# Patient Record
Sex: MALE | Race: WHITE | NOT HISPANIC OR LATINO | Employment: OTHER | URBAN - NONMETROPOLITAN AREA
[De-identification: names, ages, dates, MRNs, and addresses within clinical notes are randomized per-mention and may not be internally consistent; named-entity substitution may affect disease eponyms.]

---

## 2022-12-13 ENCOUNTER — OFFICE VISIT (OUTPATIENT)
Dept: LAB | Facility: HOSPITAL | Age: 37
End: 2022-12-13

## 2022-12-13 DIAGNOSIS — Z01.818 PRE-OP TESTING: ICD-10-CM

## 2022-12-13 LAB
ATRIAL RATE: 78 BPM
P AXIS: 32 DEGREES
PR INTERVAL: 134 MS
QRS AXIS: 27 DEGREES
QRSD INTERVAL: 98 MS
QT INTERVAL: 378 MS
QTC INTERVAL: 430 MS
T WAVE AXIS: 58 DEGREES
VENTRICULAR RATE: 78 BPM

## 2022-12-16 RX ORDER — METOPROLOL TARTRATE 50 MG/1
50 TABLET, FILM COATED ORAL EVERY 12 HOURS SCHEDULED
COMMUNITY

## 2022-12-16 RX ORDER — HYDROXYZINE HYDROCHLORIDE 25 MG/1
25 TABLET, FILM COATED ORAL
COMMUNITY

## 2022-12-16 RX ORDER — IBUPROFEN 400 MG/1
TABLET ORAL EVERY 6 HOURS PRN
COMMUNITY

## 2022-12-16 RX ORDER — LISINOPRIL 10 MG/1
10 TABLET ORAL DAILY
COMMUNITY

## 2022-12-16 NOTE — PRE-PROCEDURE INSTRUCTIONS
Pre-Surgery Instructions:   Medication Instructions   • hydrOXYzine HCL (ATARAX) 25 mg tablet Take night before surgery   • ibuprofen (MOTRIN) 400 mg tablet Stop taking 3 days prior to surgery  • lisinopril (ZESTRIL) 10 mg tablet Hold day of surgery  • metoprolol tartrate (LOPRESSOR) 50 mg tablet Take day of surgery     • naloxone (NARCAN) 0 04 mg/mL Pt has available - recovering alcoholic- has not used   See above

## 2022-12-20 ENCOUNTER — HOSPITAL ENCOUNTER (OUTPATIENT)
Facility: HOSPITAL | Age: 37
Setting detail: OUTPATIENT SURGERY
Discharge: HOME | End: 2022-12-20
Attending: STUDENT IN AN ORGANIZED HEALTH CARE EDUCATION/TRAINING PROGRAM | Admitting: STUDENT IN AN ORGANIZED HEALTH CARE EDUCATION/TRAINING PROGRAM

## 2022-12-20 ENCOUNTER — ANESTHESIA EVENT (OUTPATIENT)
Dept: PERIOP | Facility: HOSPITAL | Age: 37
End: 2022-12-20

## 2022-12-20 ENCOUNTER — ANESTHESIA (OUTPATIENT)
Dept: PERIOP | Facility: HOSPITAL | Age: 37
End: 2022-12-20

## 2022-12-20 VITALS
RESPIRATION RATE: 18 BRPM | OXYGEN SATURATION: 94 % | BODY MASS INDEX: 37.52 KG/M2 | HEART RATE: 77 BPM | HEIGHT: 71 IN | TEMPERATURE: 98.3 F | SYSTOLIC BLOOD PRESSURE: 124 MMHG | DIASTOLIC BLOOD PRESSURE: 82 MMHG | WEIGHT: 268 LBS

## 2022-12-20 DIAGNOSIS — K42.9 UMBILICAL HERNIA WITHOUT OBSTRUCTION AND WITHOUT GANGRENE: Primary | ICD-10-CM

## 2022-12-20 PROBLEM — F17.200 CURRENT SMOKER: Status: ACTIVE | Noted: 2022-12-20

## 2022-12-20 PROBLEM — I10 HYPERTENSION: Status: ACTIVE | Noted: 2022-12-20

## 2022-12-20 DEVICE — VENTRALIGHT ST MESH WITH ECHO 2 POSITIONING SYSTEM 11 CM (4.5)" CIRCLE
Type: IMPLANTABLE DEVICE | Site: UMBILICAL | Status: FUNCTIONAL
Brand: VENTRALIGHT ST MESH WITH ECHO 2 POSITIONING SYSTEM

## 2022-12-20 RX ORDER — DEXMEDETOMIDINE HYDROCHLORIDE 100 UG/ML
INJECTION, SOLUTION INTRAVENOUS AS NEEDED
Status: DISCONTINUED | OUTPATIENT
Start: 2022-12-20 | End: 2022-12-20

## 2022-12-20 RX ORDER — OXYCODONE HYDROCHLORIDE 5 MG/1
5 TABLET ORAL EVERY 4 HOURS PRN
Qty: 15 TABLET | Refills: 0 | Status: SHIPPED | OUTPATIENT
Start: 2022-12-20 | End: 2022-12-30

## 2022-12-20 RX ORDER — FENTANYL CITRATE 50 UG/ML
INJECTION, SOLUTION INTRAMUSCULAR; INTRAVENOUS
Status: COMPLETED
Start: 2022-12-20 | End: 2022-12-20

## 2022-12-20 RX ORDER — ROCURONIUM BROMIDE 10 MG/ML
INJECTION, SOLUTION INTRAVENOUS AS NEEDED
Status: DISCONTINUED | OUTPATIENT
Start: 2022-12-20 | End: 2022-12-20

## 2022-12-20 RX ORDER — ONDANSETRON 2 MG/ML
4 INJECTION INTRAMUSCULAR; INTRAVENOUS ONCE AS NEEDED
Status: DISCONTINUED | OUTPATIENT
Start: 2022-12-20 | End: 2022-12-20 | Stop reason: HOSPADM

## 2022-12-20 RX ORDER — SODIUM CHLORIDE, SODIUM LACTATE, POTASSIUM CHLORIDE, CALCIUM CHLORIDE 600; 310; 30; 20 MG/100ML; MG/100ML; MG/100ML; MG/100ML
INJECTION, SOLUTION INTRAVENOUS CONTINUOUS PRN
Status: DISCONTINUED | OUTPATIENT
Start: 2022-12-20 | End: 2022-12-20

## 2022-12-20 RX ORDER — ONDANSETRON 2 MG/ML
INJECTION INTRAMUSCULAR; INTRAVENOUS AS NEEDED
Status: DISCONTINUED | OUTPATIENT
Start: 2022-12-20 | End: 2022-12-20

## 2022-12-20 RX ORDER — MIDAZOLAM HYDROCHLORIDE 2 MG/2ML
INJECTION, SOLUTION INTRAMUSCULAR; INTRAVENOUS AS NEEDED
Status: DISCONTINUED | OUTPATIENT
Start: 2022-12-20 | End: 2022-12-20

## 2022-12-20 RX ORDER — ACETAMINOPHEN 325 MG/1
975 TABLET ORAL ONCE
Status: COMPLETED | OUTPATIENT
Start: 2022-12-20 | End: 2022-12-20

## 2022-12-20 RX ORDER — FENTANYL CITRATE 50 UG/ML
INJECTION, SOLUTION INTRAMUSCULAR; INTRAVENOUS AS NEEDED
Status: DISCONTINUED | OUTPATIENT
Start: 2022-12-20 | End: 2022-12-20

## 2022-12-20 RX ORDER — PROPOFOL 10 MG/ML
INJECTION, EMULSION INTRAVENOUS AS NEEDED
Status: DISCONTINUED | OUTPATIENT
Start: 2022-12-20 | End: 2022-12-20

## 2022-12-20 RX ORDER — LIDOCAINE HYDROCHLORIDE 10 MG/ML
INJECTION, SOLUTION EPIDURAL; INFILTRATION; INTRACAUDAL; PERINEURAL AS NEEDED
Status: DISCONTINUED | OUTPATIENT
Start: 2022-12-20 | End: 2022-12-20

## 2022-12-20 RX ORDER — HYDROMORPHONE HCL/PF 1 MG/ML
SYRINGE (ML) INJECTION AS NEEDED
Status: DISCONTINUED | OUTPATIENT
Start: 2022-12-20 | End: 2022-12-20

## 2022-12-20 RX ORDER — FENTANYL CITRATE/PF 50 MCG/ML
25 SYRINGE (ML) INJECTION
Status: DISCONTINUED | OUTPATIENT
Start: 2022-12-20 | End: 2022-12-20 | Stop reason: HOSPADM

## 2022-12-20 RX ORDER — DEXAMETHASONE SODIUM PHOSPHATE 10 MG/ML
INJECTION, SOLUTION INTRAMUSCULAR; INTRAVENOUS AS NEEDED
Status: DISCONTINUED | OUTPATIENT
Start: 2022-12-20 | End: 2022-12-20

## 2022-12-20 RX ORDER — KETOROLAC TROMETHAMINE 30 MG/ML
INJECTION, SOLUTION INTRAMUSCULAR; INTRAVENOUS AS NEEDED
Status: DISCONTINUED | OUTPATIENT
Start: 2022-12-20 | End: 2022-12-20

## 2022-12-20 RX ORDER — HYDROMORPHONE HCL/PF 1 MG/ML
0.25 SYRINGE (ML) INJECTION
Status: DISCONTINUED | OUTPATIENT
Start: 2022-12-20 | End: 2022-12-20 | Stop reason: HOSPADM

## 2022-12-20 RX ORDER — DIPHENHYDRAMINE HYDROCHLORIDE 50 MG/ML
12.5 INJECTION INTRAMUSCULAR; INTRAVENOUS ONCE AS NEEDED
Status: DISCONTINUED | OUTPATIENT
Start: 2022-12-20 | End: 2022-12-20 | Stop reason: HOSPADM

## 2022-12-20 RX ORDER — BUPIVACAINE HYDROCHLORIDE AND EPINEPHRINE 2.5; 5 MG/ML; UG/ML
INJECTION, SOLUTION EPIDURAL; INFILTRATION; INTRACAUDAL; PERINEURAL AS NEEDED
Status: DISCONTINUED | OUTPATIENT
Start: 2022-12-20 | End: 2022-12-20 | Stop reason: HOSPADM

## 2022-12-20 RX ADMIN — SODIUM CHLORIDE, SODIUM LACTATE, POTASSIUM CHLORIDE, CALCIUM CHLORIDE: 600; 310; 30; 20 INJECTION, SOLUTION INTRAVENOUS at 07:48

## 2022-12-20 RX ADMIN — SUGAMMADEX 200 MG: 100 INJECTION, SOLUTION INTRAVENOUS at 09:14

## 2022-12-20 RX ADMIN — FENTANYL CITRATE 25 MCG: 50 INJECTION INTRAMUSCULAR; INTRAVENOUS at 09:37

## 2022-12-20 RX ADMIN — HYDROMORPHONE HYDROCHLORIDE 0.5 MG: 1 INJECTION, SOLUTION INTRAMUSCULAR; INTRAVENOUS; SUBCUTANEOUS at 08:21

## 2022-12-20 RX ADMIN — DEXMEDETOMIDINE HCL 8 MCG: 100 INJECTION INTRAVENOUS at 09:10

## 2022-12-20 RX ADMIN — ONDANSETRON 4 MG: 2 INJECTION INTRAMUSCULAR; INTRAVENOUS at 07:42

## 2022-12-20 RX ADMIN — CEFAZOLIN 3000 MG: 1 INJECTION, POWDER, FOR SOLUTION INTRAMUSCULAR; INTRAVENOUS at 07:39

## 2022-12-20 RX ADMIN — DEXAMETHASONE SODIUM PHOSPHATE 10 MG: 10 INJECTION, SOLUTION INTRAMUSCULAR; INTRAVENOUS at 07:42

## 2022-12-20 RX ADMIN — MIDAZOLAM 2 MG: 1 INJECTION INTRAMUSCULAR; INTRAVENOUS at 07:34

## 2022-12-20 RX ADMIN — DEXMEDETOMIDINE HCL 12 MCG: 100 INJECTION INTRAVENOUS at 09:12

## 2022-12-20 RX ADMIN — KETOROLAC TROMETHAMINE 30 MG: 30 INJECTION, SOLUTION INTRAMUSCULAR at 09:00

## 2022-12-20 RX ADMIN — SODIUM CHLORIDE, SODIUM LACTATE, POTASSIUM CHLORIDE, AND CALCIUM CHLORIDE: .6; .31; .03; .02 INJECTION, SOLUTION INTRAVENOUS at 07:36

## 2022-12-20 RX ADMIN — DEXMEDETOMIDINE HCL 8 MCG: 100 INJECTION INTRAVENOUS at 08:09

## 2022-12-20 RX ADMIN — ACETAMINOPHEN 975 MG: 325 TABLET ORAL at 09:52

## 2022-12-20 RX ADMIN — HYDROMORPHONE HYDROCHLORIDE 0.5 MG: 1 INJECTION, SOLUTION INTRAMUSCULAR; INTRAVENOUS; SUBCUTANEOUS at 09:08

## 2022-12-20 RX ADMIN — FENTANYL CITRATE 25 MCG: 50 INJECTION INTRAMUSCULAR; INTRAVENOUS at 09:44

## 2022-12-20 RX ADMIN — ROCURONIUM BROMIDE 20 MG: 10 INJECTION, SOLUTION INTRAVENOUS at 08:08

## 2022-12-20 RX ADMIN — DEXMEDETOMIDINE HCL 8 MCG: 100 INJECTION INTRAVENOUS at 08:14

## 2022-12-20 RX ADMIN — FENTANYL CITRATE 100 MCG: 50 INJECTION INTRAMUSCULAR; INTRAVENOUS at 07:42

## 2022-12-20 RX ADMIN — ROCURONIUM BROMIDE 10 MG: 10 INJECTION, SOLUTION INTRAVENOUS at 08:52

## 2022-12-20 RX ADMIN — LIDOCAINE HYDROCHLORIDE 50 MG: 10 INJECTION, SOLUTION EPIDURAL; INFILTRATION; INTRACAUDAL; PERINEURAL at 07:42

## 2022-12-20 RX ADMIN — ROCURONIUM BROMIDE 50 MG: 10 INJECTION, SOLUTION INTRAVENOUS at 07:42

## 2022-12-20 RX ADMIN — HYDROMORPHONE HYDROCHLORIDE 0.25 MG: 1 INJECTION, SOLUTION INTRAMUSCULAR; INTRAVENOUS; SUBCUTANEOUS at 09:55

## 2022-12-20 RX ADMIN — HYDROMORPHONE HYDROCHLORIDE 0.25 MG: 1 INJECTION, SOLUTION INTRAMUSCULAR; INTRAVENOUS; SUBCUTANEOUS at 10:05

## 2022-12-20 RX ADMIN — PROPOFOL 200 MG: 10 INJECTION, EMULSION INTRAVENOUS at 07:42

## 2022-12-20 RX ADMIN — DEXMEDETOMIDINE HCL 4 MCG: 100 INJECTION INTRAVENOUS at 08:52

## 2022-12-20 NOTE — DISCHARGE INSTR - AVS FIRST PAGE
Robotic [address] Rexann Riling phone number is [phone]    DISCHARGE INSTRUCTIONS:   Medicines:  Ibuprofen or acetaminophen:  These medicines decrease pain  Alternate tylenol and ibuprofen for pain control  Prescription pain medication:  You have been prescribed Oxycodone  Take this as needed for severe pain  Take your medicine as directed  Contact your healthcare provider if you think your medicine is not helping or if you have side effects  Tell him of her if you are allergic to any medicine  Keep a list of the medicines, vitamins, and herbs you take  Include the amounts, and when and why you take them  Bring the list or the pill bottles to follow-up visits  Carry your medicine list with you in case of an emergency  Follow up with your healthcare provider as scheduled    Remove the dressing in 3 days    Self care: Activity:  Avoid lifting more then 10lb, bending, and straining for 6 weeks  If you have to cough, try to cough gently  Support the hernia by holding your hand or a pillow over it when coughing  Prevent constipation:  Straining to have a bowel movement may make your pain worse  Eat foods that are high in fiber and drink at least 8 glasses of water a day  A high-fiber diet includes whole grains, bran, cereals, and uncooked fruit and vegetables  Walking or other exercise can also help  Your healthcare provider may give you fiber medicine or a stool softener to help make your bowel movements softer and more regular  If you still do not move your bowels, take colace or milk of magnesia    You may shower, do not soak in water for 2 weeks    Contact your healthcare provider if:   You have nausea or vomiting  You have severe pain  You are constipated or have blood in your bowel movements  You have questions or concerns about your condition or care

## 2022-12-20 NOTE — ANESTHESIA PREPROCEDURE EVALUATION
Procedure:  ROBOTIC ASSISTED LAPAROSCOPIC UMBILICAL HERNIA REPAIR WITH MESH (Abdomen)  OPEN UMBILICAL HERNIA REPAIR (Abdomen)    Relevant Problems   CARDIO   (+) Hypertension      PULMONARY   (+) Current smoker      Other   (+) Umbilical hernia        Physical Exam    Airway    Mallampati score: II  TM Distance: >3 FB  Neck ROM: full     Dental       Cardiovascular      Pulmonary  No wheezes,     Other Findings        Anesthesia Plan  ASA Score- 2     Anesthesia Type- general with ASA Monitors  Additional Monitors:   Airway Plan: ETT  Plan Factors-Exercise tolerance (METS): >4 METS  Chart reviewed  EKG reviewed  Patient summary reviewed  Patient is a current smoker  Patient smoked on day of surgery  Induction- intravenous  Postoperative Plan- Plan for postoperative opioid use  Planned trial extubation    Informed Consent- Anesthetic plan and risks discussed with patient  I personally reviewed this patient with the CRNA  Discussed and agreed on the Anesthesia Plan with the CRNA             This is a 44 y o  male who presents for ROBOTIC [address] WITH MESH (Abdomen)  OPEN UMBILICAL HERNIA REPAIR (Abdomen)  -- VITALS --  /76   Pulse 76   Temp 98 3 °F (36 8 °C) (Oral)   Resp 18   Ht 5' 11" (1 803 m)   Wt 122 kg (268 lb)   SpO2 95%   BMI 37 38 kg/m²   BP Readings from Last 3 Encounters:   12/20/22 123/76     -- LABS --  No results for input(s): SODIUM, K, CL, CO2, AGAP, BUN, CREATININE, CALCIUM, GLUC, CAION, PHOS, MG, AST, ALT, ALKPHOS, TBILI, ALB in the last 8784 hours  No results for input(s): WBC, HGB, HCT, PLT in the last 8784 hours  No results for input(s): APTT, INR, PTT in the last 8784 hours      -- ANESTHESIA RISK ASSESSMENT AND SHARED DECISION MAKING --  • Benefits and role of specialized anesthesia care discussed (43 Nica Florentino, PMID [id number]): (1) Specialized anesthesiology support reduces mortality for major surgery by about 100-fold, (2) Anesthesia provides amnesia to the extent appropriate and possible, and (3) Anesthesia works to address analgesia and other symptom control  • The mortality risks associated with anesthesia based on ASA-PS were discussed (PMID [id number]): ASA-PS I 1:250,000; ASA-PS II 1:20,000; ASA-PS III 1:3,500; ASA-PS IV 1:1,800  • The morbidity risks discussed included were but not limited to the following (PMID [id number]):  (1) Neurologic risks: I discussed IntraOp awareness (Risk is ~1:1,000 - 1:14,000, PMID [id number]), Stroke (Risk ~<0 1-2% for most cases, PMID [id number]), and POCD  I also exhorted the patient to avoid driving or performing any other physically or mentally hazardous tasks for 24 hrs after anesthesia  - Since regional anesthesia may be used, risks of block failure, bleeding, infection, and nerve injury were discussed  (2) Airway / Pulmonary risks: I discussed the potential for dental or mouth injury, throat pain, critical hypoxia (which can lead to strokes and organ damage), pneumothorax, and any relevant concerns for prolonged intubation   - Airway considerations: No prior advanced airway notes in [facility]   - Pulmonary risks: Simplified ARISCAT score (Major RFs: Case surgical time estimated at >2hrs: 1pt and Other factors/Clinical gestalt: 1 pt); the estimated risk of pulmonary complications is: 6-2= Low, 1 6%  (3) Cardiovascular risks: I discussed periOp MACE risk (see below) as well as risks of bleeding, infection, or injury to adjacent structures related to relevant vascular access  I discussed risks associated with bypass circuits if relevant         (a) EKG:   Encounter Date: 12/13/22   ECG 12 lead   Result Value    Ventricular Rate 78    Atrial Rate 78    KY Interval 134    QRSD Interval 98    QT Interval 378    QTC Interval 430    P Axis 32    QRS Axis 27    T Wave Axis 58    Narrative    Normal sinus rhythm with sinus arrhythmia  Normal ECG  No previous ECGs available  Confirmed by Anya Oh ([id number]) on 12/13/2022 8:05:15 AM     No results found for this or any previous visit  (b) Echo/Relevant Imaging:   TTE not available  (c) Major risks for severe cardiac instability: none      (d) Gaurav's RCRI (Major RFs: none); the estimate risk of MACE is: 0= 0 4%  (e) Beta blockers indicated?: home metop    (4) FEN/GI risks: I discussed the risk of aspiration (Approximately 0 5% risk per IRIS trial) and PONV (10-80% depending on Apfel criteria)    - Patient meets ASA NPO guidelines: yes    (5) Drug reactions, allergic reactions, overdoses and other drug-related risks:  - Anticoagulation status: none  - Patient took the following medications this morning: metoprolol  - Personal or family history of anesthesia related complications: no  - Pregnancy Status: Not applicable

## 2022-12-20 NOTE — OP NOTE
OPERATIVE REPORT  PATIENT NAME: Kane Krause    :  1985  MRN: [id number]  Pt Location: [facility] OR ROOM 01    SURGERY DATE: 2022    Surgeon(s) and Role:     * Tushar Bearden,  - Primary     * Fartun Patel PA-C - Assisting    Preop Diagnosis:  Umbilical hernia without obstruction or gangrene [K42 9]    Post-Op Diagnosis Codes:     * Umbilical hernia without obstruction or gangrene [K42 9]    Procedure(s) (LRB):  ROBOTIC ASSISTED LAPAROSCOPIC UMBILICAL HERNIA REPAIR WITH MESH (N/A)  OPEN UMBILICAL HERNIA REPAIR (N/A)    Specimen(s):  * No specimens in log *    Estimated Blood Loss:   Minimal    Drains:  [REMOVED] NG/OG/Enteral Tube Orogastric 18 Fr Center mouth (Removed)   Number of days: 0       Anesthesia Type:   General    Operative Indications:  Umbilical hernia without obstruction or gangrene [K42 9]      Operative Findings:  2 cm umbilical hernia containing omentum    Complications:   None    Procedure and Technique:  The patient is brought to the operating room  A timeout was held the patient identified and procedure verified  Appropriate antibiotic prophylaxis and DVT prophylaxis was used  General anesthesia was administered  The area of the abdomen was prepped and draped usual sterile fashion using chlorhexidine solution  Local anesthesia using 0 25% Marcaine with epinephrine was infiltrated in the left subcostal area  The skin was grasped and elevated using towel clamps  A small incision was made using 11 blade scalpel  A Veress needle was placed and confirmed to be intraperitoneal using a saline drop test   The abdomen was insufflated to 15 mmHg intraperitoneal pressure  A 12 mm trocar was placed  The abdomen was inspected  There were adhesions of the omentum in the area of the hernia defect, otherwise the abdomen was free of adhesions    An additional 12 mm trocar was placed in the left lateral abdomen after infiltration of local anesthesia and under direct visualization  An additional 8 mm robotic trocar was placed in the left lower abdomen in the same fashion  The patient was rotated towards the right side of the table was flexed  The robotic cart was advanced into the operative field and appropriately docked  I then took control at the console  Adhesions of the omentum to the anterior abdominal wall were taken down using sharp dissection  The omentum was reduced out of the hernia defect  The preperitoneal fat surrounding the hernia defect was cleared  The hernia defect was closed in a running suture of #1 V-Loc  An 11 cm circular ventral light ST mesh with the echo 2 positioning system was selected  This was introduced into the peritoneal cavity  The central suture was externalized through the center of the hernia defect  The mesh was then secured circumferentially using 201 180-day absorbable V-Loc  The mesh appeared to be in good position  The hernia repair was adequate  The robot was undocked  Fascia of the 12 mm trocar sites was closed using 0 Vicryl with a laparoscopic suture passer  All trochars were removed and the abdomen was desufflated  Skin was closing 4-0 Vicryl in the subcuticular layer  The area of the hernia was packed with gauze and covered with a Tegaderm  The skin incisions were covered with skin glue  This patient tolerated procedure well transferred to recovery in stable condition  At the end the procedure all needle instrument sponge counts were correct x2     I was present for the entire procedure and A physician assistant was required during the procedure for retraction tissue handling,dissection and suturing    Patient Disposition:  PACU         SIGNATURE: Yeny Blair DO  DATE: December 20, 2022  TIME: 9:12 AM

## 2022-12-20 NOTE — ANESTHESIA POSTPROCEDURE EVALUATION
Post-Op Assessment Note    CV Status:  Stable  Pain Score: 0    Pain management: adequate     Mental Status:  Sleepy   Hydration Status:  Stable   PONV Controlled:  None   Airway Patency:  Patent   Two or more mitigation strategies used for obstructive sleep apnea   Post Op Vitals Reviewed: Yes      Staff: Anesthesiologist, CRNA         No notable events documented      BP   135/77   Temp   98 2   Pulse  64   Resp   18   SpO2   97% on facemask

## 2023-10-10 ENCOUNTER — HOSPITAL ENCOUNTER (EMERGENCY)
Facility: HOSPITAL | Age: 38
End: 2023-10-10
Attending: EMERGENCY MEDICINE | Admitting: EMERGENCY MEDICINE
Payer: COMMERCIAL

## 2023-10-10 ENCOUNTER — HOSPITAL ENCOUNTER (INPATIENT)
Facility: HOSPITAL | Age: 38
LOS: 2 days | End: 2023-10-12
Attending: EMERGENCY MEDICINE | Admitting: EMERGENCY MEDICINE
Payer: COMMERCIAL

## 2023-10-10 ENCOUNTER — APPOINTMENT (EMERGENCY)
Dept: CT IMAGING | Facility: HOSPITAL | Age: 38
End: 2023-10-10
Payer: COMMERCIAL

## 2023-10-10 VITALS
WEIGHT: 260 LBS | SYSTOLIC BLOOD PRESSURE: 166 MMHG | TEMPERATURE: 98 F | HEART RATE: 88 BPM | RESPIRATION RATE: 18 BRPM | OXYGEN SATURATION: 97 % | DIASTOLIC BLOOD PRESSURE: 92 MMHG | BODY MASS INDEX: 35.21 KG/M2 | HEIGHT: 72 IN

## 2023-10-10 DIAGNOSIS — K85.20 ALCOHOL-INDUCED ACUTE PANCREATITIS WITHOUT INFECTION OR NECROSIS: Primary | ICD-10-CM

## 2023-10-10 DIAGNOSIS — K85.20 ALCOHOL-INDUCED ACUTE PANCREATITIS WITHOUT INFECTION OR NECROSIS: ICD-10-CM

## 2023-10-10 LAB
2HR DELTA HS TROPONIN: 1 NG/L
ALBUMIN SERPL BCP-MCNC: 4.1 G/DL (ref 3.5–5)
ALP SERPL-CCNC: 45 U/L (ref 34–104)
ALT SERPL W P-5'-P-CCNC: 134 U/L (ref 7–52)
AMPHETAMINES SERPL QL SCN: NEGATIVE
ANION GAP SERPL CALCULATED.3IONS-SCNC: 9 MMOL/L
APTT PPP: 24 SECONDS (ref 23–37)
AST SERPL W P-5'-P-CCNC: 65 U/L (ref 13–39)
ATRIAL RATE: 79 BPM
BACTERIA UR QL AUTO: NORMAL /HPF
BARBITURATES UR QL: NEGATIVE
BASOPHILS # BLD AUTO: 0.04 THOUSANDS/ÂΜL (ref 0–0.1)
BASOPHILS NFR BLD AUTO: 0 % (ref 0–1)
BENZODIAZ UR QL: NEGATIVE
BILIRUB SERPL-MCNC: 0.57 MG/DL (ref 0.2–1)
BILIRUB UR QL STRIP: ABNORMAL
BUN SERPL-MCNC: 10 MG/DL (ref 5–25)
CALCIUM SERPL-MCNC: 8.8 MG/DL (ref 8.4–10.2)
CARDIAC TROPONIN I PNL SERPL HS: 3 NG/L
CARDIAC TROPONIN I PNL SERPL HS: 4 NG/L
CHLORIDE SERPL-SCNC: 103 MMOL/L (ref 96–108)
CLARITY UR: CLEAR
CO2 SERPL-SCNC: 23 MMOL/L (ref 21–32)
COCAINE UR QL: NEGATIVE
COLOR UR: YELLOW
CREAT SERPL-MCNC: 0.73 MG/DL (ref 0.6–1.3)
EOSINOPHIL # BLD AUTO: 0.07 THOUSAND/ÂΜL (ref 0–0.61)
EOSINOPHIL NFR BLD AUTO: 1 % (ref 0–6)
ERYTHROCYTE [DISTWIDTH] IN BLOOD BY AUTOMATED COUNT: 13.7 % (ref 11.6–15.1)
ETHANOL SERPL-MCNC: <10 MG/DL
GFR SERPL CREATININE-BSD FRML MDRD: 116 ML/MIN/1.73SQ M
GLUCOSE SERPL-MCNC: 138 MG/DL (ref 65–140)
GLUCOSE UR STRIP-MCNC: NEGATIVE MG/DL
HCT VFR BLD AUTO: 46.3 % (ref 36.5–49.3)
HGB BLD-MCNC: 16.4 G/DL (ref 12–17)
HGB UR QL STRIP.AUTO: NEGATIVE
IMM GRANULOCYTES # BLD AUTO: 0.06 THOUSAND/UL (ref 0–0.2)
IMM GRANULOCYTES NFR BLD AUTO: 1 % (ref 0–2)
INR PPP: 0.92 (ref 0.84–1.19)
KETONES UR STRIP-MCNC: NEGATIVE MG/DL
LACTATE SERPL-SCNC: 1.3 MMOL/L (ref 0.5–2)
LACTATE SERPL-SCNC: 2.3 MMOL/L (ref 0.5–2)
LEUKOCYTE ESTERASE UR QL STRIP: NEGATIVE
LIPASE SERPL-CCNC: 3832 U/L (ref 11–82)
LYMPHOCYTES # BLD AUTO: 1.68 THOUSANDS/ÂΜL (ref 0.6–4.47)
LYMPHOCYTES NFR BLD AUTO: 15 % (ref 14–44)
MAGNESIUM SERPL-MCNC: 1.9 MG/DL (ref 1.9–2.7)
MCH RBC QN AUTO: 31.5 PG (ref 26.8–34.3)
MCHC RBC AUTO-ENTMCNC: 35.4 G/DL (ref 31.4–37.4)
MCV RBC AUTO: 89 FL (ref 82–98)
METHADONE UR QL: NEGATIVE
MONOCYTES # BLD AUTO: 0.78 THOUSAND/ÂΜL (ref 0.17–1.22)
MONOCYTES NFR BLD AUTO: 7 % (ref 4–12)
NEUTROPHILS # BLD AUTO: 8.48 THOUSANDS/ÂΜL (ref 1.85–7.62)
NEUTS SEG NFR BLD AUTO: 76 % (ref 43–75)
NITRITE UR QL STRIP: NEGATIVE
NON-SQ EPI CELLS URNS QL MICRO: NORMAL /HPF
NRBC BLD AUTO-RTO: 0 /100 WBCS
OPIATES UR QL SCN: NEGATIVE
OXYCODONE+OXYMORPHONE UR QL SCN: NEGATIVE
P AXIS: 35 DEGREES
PCP UR QL: NEGATIVE
PH UR STRIP.AUTO: 5.5 [PH]
PLATELET # BLD AUTO: 175 THOUSANDS/UL (ref 149–390)
PMV BLD AUTO: 10 FL (ref 8.9–12.7)
POTASSIUM SERPL-SCNC: 4.2 MMOL/L (ref 3.5–5.3)
PR INTERVAL: 122 MS
PROT SERPL-MCNC: 7 G/DL (ref 6.4–8.4)
PROT UR STRIP-MCNC: ABNORMAL MG/DL
PROTHROMBIN TIME: 12.7 SECONDS (ref 11.6–14.5)
QRS AXIS: 29 DEGREES
QRSD INTERVAL: 102 MS
QT INTERVAL: 366 MS
QTC INTERVAL: 419 MS
RBC # BLD AUTO: 5.21 MILLION/UL (ref 3.88–5.62)
RBC #/AREA URNS AUTO: NORMAL /HPF
SODIUM SERPL-SCNC: 135 MMOL/L (ref 135–147)
SP GR UR STRIP.AUTO: >=1.03 (ref 1–1.03)
T WAVE AXIS: 46 DEGREES
THC UR QL: NEGATIVE
UROBILINOGEN UR QL STRIP.AUTO: 0.2 E.U./DL
VENTRICULAR RATE: 79 BPM
WBC # BLD AUTO: 11.11 THOUSAND/UL (ref 4.31–10.16)
WBC #/AREA URNS AUTO: NORMAL /HPF

## 2023-10-10 PROCEDURE — 80053 COMPREHEN METABOLIC PANEL: CPT | Performed by: EMERGENCY MEDICINE

## 2023-10-10 PROCEDURE — 83605 ASSAY OF LACTIC ACID: CPT | Performed by: EMERGENCY MEDICINE

## 2023-10-10 PROCEDURE — 96361 HYDRATE IV INFUSION ADD-ON: CPT

## 2023-10-10 PROCEDURE — 85610 PROTHROMBIN TIME: CPT | Performed by: EMERGENCY MEDICINE

## 2023-10-10 PROCEDURE — 83690 ASSAY OF LIPASE: CPT | Performed by: EMERGENCY MEDICINE

## 2023-10-10 PROCEDURE — 82077 ASSAY SPEC XCP UR&BREATH IA: CPT | Performed by: EMERGENCY MEDICINE

## 2023-10-10 PROCEDURE — 99285 EMERGENCY DEPT VISIT HI MDM: CPT | Performed by: EMERGENCY MEDICINE

## 2023-10-10 PROCEDURE — 36415 COLL VENOUS BLD VENIPUNCTURE: CPT | Performed by: EMERGENCY MEDICINE

## 2023-10-10 PROCEDURE — 84484 ASSAY OF TROPONIN QUANT: CPT | Performed by: EMERGENCY MEDICINE

## 2023-10-10 PROCEDURE — 93010 ELECTROCARDIOGRAM REPORT: CPT | Performed by: STUDENT IN AN ORGANIZED HEALTH CARE EDUCATION/TRAINING PROGRAM

## 2023-10-10 PROCEDURE — G1004 CDSM NDSC: HCPCS

## 2023-10-10 PROCEDURE — 81001 URINALYSIS AUTO W/SCOPE: CPT | Performed by: EMERGENCY MEDICINE

## 2023-10-10 PROCEDURE — 85025 COMPLETE CBC W/AUTO DIFF WBC: CPT | Performed by: EMERGENCY MEDICINE

## 2023-10-10 PROCEDURE — 85730 THROMBOPLASTIN TIME PARTIAL: CPT | Performed by: EMERGENCY MEDICINE

## 2023-10-10 PROCEDURE — 83735 ASSAY OF MAGNESIUM: CPT | Performed by: EMERGENCY MEDICINE

## 2023-10-10 PROCEDURE — 99285 EMERGENCY DEPT VISIT HI MDM: CPT

## 2023-10-10 PROCEDURE — 96375 TX/PRO/DX INJ NEW DRUG ADDON: CPT

## 2023-10-10 PROCEDURE — 96376 TX/PRO/DX INJ SAME DRUG ADON: CPT

## 2023-10-10 PROCEDURE — C9113 INJ PANTOPRAZOLE SODIUM, VIA: HCPCS | Performed by: EMERGENCY MEDICINE

## 2023-10-10 PROCEDURE — 93005 ELECTROCARDIOGRAM TRACING: CPT

## 2023-10-10 PROCEDURE — 80307 DRUG TEST PRSMV CHEM ANLYZR: CPT | Performed by: EMERGENCY MEDICINE

## 2023-10-10 PROCEDURE — 96374 THER/PROPH/DIAG INJ IV PUSH: CPT

## 2023-10-10 PROCEDURE — 74177 CT ABD & PELVIS W/CONTRAST: CPT

## 2023-10-10 RX ORDER — ACETAMINOPHEN 325 MG/1
650 TABLET ORAL EVERY 6 HOURS PRN
Status: DISCONTINUED | OUTPATIENT
Start: 2023-10-10 | End: 2023-10-11

## 2023-10-10 RX ORDER — MORPHINE SULFATE 10 MG/ML
6 INJECTION, SOLUTION INTRAMUSCULAR; INTRAVENOUS EVERY 4 HOURS PRN
Status: DISCONTINUED | OUTPATIENT
Start: 2023-10-10 | End: 2023-10-12 | Stop reason: HOSPADM

## 2023-10-10 RX ORDER — HYDROMORPHONE HCL/PF 1 MG/ML
0.5 SYRINGE (ML) INJECTION
Status: DISCONTINUED | OUTPATIENT
Start: 2023-10-10 | End: 2023-10-10 | Stop reason: HOSPADM

## 2023-10-10 RX ORDER — ONDANSETRON 2 MG/ML
4 INJECTION INTRAMUSCULAR; INTRAVENOUS EVERY 6 HOURS PRN
Status: DISCONTINUED | OUTPATIENT
Start: 2023-10-10 | End: 2023-10-12 | Stop reason: HOSPADM

## 2023-10-10 RX ORDER — KETOROLAC TROMETHAMINE 30 MG/ML
15 INJECTION, SOLUTION INTRAMUSCULAR; INTRAVENOUS EVERY 6 HOURS PRN
Status: DISCONTINUED | OUTPATIENT
Start: 2023-10-10 | End: 2023-10-12

## 2023-10-10 RX ORDER — TRAZODONE HYDROCHLORIDE 50 MG/1
50 TABLET ORAL
Status: DISCONTINUED | OUTPATIENT
Start: 2023-10-10 | End: 2023-10-12

## 2023-10-10 RX ORDER — MAGNESIUM HYDROXIDE/ALUMINUM HYDROXICE/SIMETHICONE 120; 1200; 1200 MG/30ML; MG/30ML; MG/30ML
30 SUSPENSION ORAL EVERY 6 HOURS PRN
Status: CANCELLED | OUTPATIENT
Start: 2023-10-10

## 2023-10-10 RX ORDER — ACETAMINOPHEN 325 MG/1
650 TABLET ORAL EVERY 6 HOURS PRN
Status: CANCELLED | OUTPATIENT
Start: 2023-10-10

## 2023-10-10 RX ORDER — FENTANYL CITRATE 50 UG/ML
100 INJECTION, SOLUTION INTRAMUSCULAR; INTRAVENOUS ONCE
Status: COMPLETED | OUTPATIENT
Start: 2023-10-10 | End: 2023-10-10

## 2023-10-10 RX ORDER — SODIUM CHLORIDE 9 MG/ML
100 INJECTION, SOLUTION INTRAVENOUS CONTINUOUS
Status: CANCELLED | OUTPATIENT
Start: 2023-10-10

## 2023-10-10 RX ORDER — HYDROMORPHONE HCL/PF 1 MG/ML
0.5 SYRINGE (ML) INJECTION ONCE
Status: COMPLETED | OUTPATIENT
Start: 2023-10-10 | End: 2023-10-10

## 2023-10-10 RX ORDER — ONDANSETRON 2 MG/ML
4 INJECTION INTRAMUSCULAR; INTRAVENOUS EVERY 6 HOURS PRN
Status: CANCELLED | OUTPATIENT
Start: 2023-10-10

## 2023-10-10 RX ORDER — LORAZEPAM 2 MG/ML
1 INJECTION INTRAMUSCULAR ONCE
Status: COMPLETED | OUTPATIENT
Start: 2023-10-10 | End: 2023-10-10

## 2023-10-10 RX ORDER — NICOTINE 21 MG/24HR
1 PATCH, TRANSDERMAL 24 HOURS TRANSDERMAL DAILY
Status: CANCELLED | OUTPATIENT
Start: 2023-10-11

## 2023-10-10 RX ORDER — TRAZODONE HYDROCHLORIDE 50 MG/1
50 TABLET ORAL
Status: CANCELLED | OUTPATIENT
Start: 2023-10-10

## 2023-10-10 RX ORDER — KETOROLAC TROMETHAMINE 30 MG/ML
15 INJECTION, SOLUTION INTRAMUSCULAR; INTRAVENOUS EVERY 6 HOURS PRN
Status: CANCELLED | OUTPATIENT
Start: 2023-10-10 | End: 2023-10-13

## 2023-10-10 RX ORDER — ONDANSETRON 2 MG/ML
4 INJECTION INTRAMUSCULAR; INTRAVENOUS ONCE
Status: COMPLETED | OUTPATIENT
Start: 2023-10-10 | End: 2023-10-10

## 2023-10-10 RX ORDER — ENOXAPARIN SODIUM 100 MG/ML
40 INJECTION SUBCUTANEOUS DAILY
Status: DISCONTINUED | OUTPATIENT
Start: 2023-10-11 | End: 2023-10-12 | Stop reason: HOSPADM

## 2023-10-10 RX ORDER — NICOTINE 21 MG/24HR
1 PATCH, TRANSDERMAL 24 HOURS TRANSDERMAL DAILY
Status: DISCONTINUED | OUTPATIENT
Start: 2023-10-11 | End: 2023-10-12 | Stop reason: HOSPADM

## 2023-10-10 RX ORDER — MAGNESIUM HYDROXIDE/ALUMINUM HYDROXICE/SIMETHICONE 120; 1200; 1200 MG/30ML; MG/30ML; MG/30ML
30 SUSPENSION ORAL EVERY 6 HOURS PRN
Status: DISCONTINUED | OUTPATIENT
Start: 2023-10-10 | End: 2023-10-12 | Stop reason: HOSPADM

## 2023-10-10 RX ORDER — PHENOBARBITAL SODIUM 130 MG/ML
260 INJECTION INTRAMUSCULAR ONCE
Status: COMPLETED | OUTPATIENT
Start: 2023-10-10 | End: 2023-10-10

## 2023-10-10 RX ORDER — PANTOPRAZOLE SODIUM 40 MG/10ML
40 INJECTION, POWDER, LYOPHILIZED, FOR SOLUTION INTRAVENOUS ONCE
Status: COMPLETED | OUTPATIENT
Start: 2023-10-10 | End: 2023-10-10

## 2023-10-10 RX ORDER — SODIUM CHLORIDE 9 MG/ML
100 INJECTION, SOLUTION INTRAVENOUS CONTINUOUS
Status: DISCONTINUED | OUTPATIENT
Start: 2023-10-10 | End: 2023-10-11

## 2023-10-10 RX ORDER — MORPHINE SULFATE 10 MG/ML
6 INJECTION, SOLUTION INTRAMUSCULAR; INTRAVENOUS EVERY 4 HOURS PRN
Status: CANCELLED | OUTPATIENT
Start: 2023-10-10

## 2023-10-10 RX ORDER — ENOXAPARIN SODIUM 100 MG/ML
40 INJECTION SUBCUTANEOUS DAILY
Status: CANCELLED | OUTPATIENT
Start: 2023-10-11

## 2023-10-10 RX ADMIN — HYDROMORPHONE HYDROCHLORIDE 0.5 MG: 1 INJECTION, SOLUTION INTRAMUSCULAR; INTRAVENOUS; SUBCUTANEOUS at 22:22

## 2023-10-10 RX ADMIN — SODIUM CHLORIDE 2000 ML: 0.9 INJECTION, SOLUTION INTRAVENOUS at 16:48

## 2023-10-10 RX ADMIN — FENTANYL CITRATE 100 MCG: 50 INJECTION INTRAMUSCULAR; INTRAVENOUS at 18:03

## 2023-10-10 RX ADMIN — SODIUM CHLORIDE 100 ML/HR: 0.9 INJECTION, SOLUTION INTRAVENOUS at 23:45

## 2023-10-10 RX ADMIN — MORPHINE SULFATE 2 MG: 2 INJECTION, SOLUTION INTRAMUSCULAR; INTRAVENOUS at 16:49

## 2023-10-10 RX ADMIN — LORAZEPAM 1 MG: 2 INJECTION INTRAMUSCULAR; INTRAVENOUS at 16:49

## 2023-10-10 RX ADMIN — IOHEXOL 100 ML: 350 INJECTION, SOLUTION INTRAVENOUS at 17:03

## 2023-10-10 RX ADMIN — MORPHINE SULFATE 6 MG: 10 INJECTION INTRAVENOUS at 23:30

## 2023-10-10 RX ADMIN — PANTOPRAZOLE SODIUM 40 MG: 40 INJECTION, POWDER, FOR SOLUTION INTRAVENOUS at 16:49

## 2023-10-10 RX ADMIN — HYDROMORPHONE HYDROCHLORIDE 0.5 MG: 1 INJECTION, SOLUTION INTRAMUSCULAR; INTRAVENOUS; SUBCUTANEOUS at 20:58

## 2023-10-10 RX ADMIN — HYDROMORPHONE HYDROCHLORIDE 0.5 MG: 1 INJECTION, SOLUTION INTRAMUSCULAR; INTRAVENOUS; SUBCUTANEOUS at 19:11

## 2023-10-10 RX ADMIN — ONDANSETRON 4 MG: 2 INJECTION INTRAMUSCULAR; INTRAVENOUS at 16:49

## 2023-10-10 RX ADMIN — PHENOBARBITAL SODIUM 260 MG: 130 INJECTION INTRAMUSCULAR at 23:53

## 2023-10-10 NOTE — ED PROVIDER NOTES
History  Chief Complaint   Patient presents with   • Abdominal Pain     Patient with a history of alcohol abuse. Drinks whiskey and beer daily. States has been worse the last few days. Now complains of epigastric pain going to his back for the past 2 days. Has nausea but no vomiting. Decreased appetite. Balance or diarrhea. No bloody stools or black stools. No tarry stools. Does have a past history of pancreatitis. No recent cough or cold symptoms. No known trauma. No vomiting. History provided by:  Patient   used: No    Abdominal Pain  Pain location:  Epigastric  Pain quality: aching    Pain radiates to:  Back  Pain severity:  Mild  Onset quality:  Gradual  Duration:  2 days  Timing:  Constant  Progression:  Worsening  Chronicity:  New  Context: alcohol use and previous surgery    Context: not awakening from sleep, not eating and not suspicious food intake    Relieved by:  Nothing  Worsened by:  Nothing  Ineffective treatments:  None tried  Associated symptoms: anorexia, diarrhea and nausea    Associated symptoms: no chest pain, no chills, no constipation, no cough, no dysuria, no fever, no hematemesis, no hematuria, no shortness of breath, no sore throat and no vomiting        Prior to Admission Medications   Prescriptions Last Dose Informant Patient Reported?  Taking?   hydrOXYzine HCL (ATARAX) 25 mg tablet   Yes No   Sig: Take 25 mg by mouth daily at bedtime   ibuprofen (MOTRIN) 400 mg tablet   Yes No   Sig: Take by mouth every 6 (six) hours as needed for mild pain   lisinopril (ZESTRIL) 10 mg tablet   Yes No   Sig: Take 10 mg by mouth daily   metoprolol tartrate (LOPRESSOR) 50 mg tablet   Yes No   Sig: Take 50 mg by mouth every 12 (twelve) hours   naloxone (NARCAN) 0.04 mg/mL   Yes No   Sig: Inject 0.04 mg into a catheter in a vein Q1MIN PRN      Facility-Administered Medications: None       Past Medical History:   Diagnosis Date   • Anxiety    • COVID-19     5/2022   • History of alcohol abuse     pt stopped on 9/10/22   • Hypertension    • Umbilical hernia        Past Surgical History:   Procedure Laterality Date   • NO PAST SURGERIES     • MD LAPS REPAIR HERNIA EXCEPT INCAL/INGUN REDUCIBLE N/A 12/20/2022    Procedure: ROBOTIC ASSISTED LAPAROSCOPIC UMBILICAL HERNIA REPAIR WITH MESH;  Surgeon: Serge Jacobo DO;  Location:  MAIN OR;  Service: General       History reviewed. No pertinent family history. I have reviewed and agree with the history as documented. E-Cigarette/Vaping   • E-Cigarette Use Never User      E-Cigarette/Vaping Substances   • Nicotine No    • THC No    • CBD No    • Flavoring No    • Other No    • Unknown No      Social History     Tobacco Use   • Smoking status: Every Day     Packs/day: 1.00     Years: 20.00     Total pack years: 20.00     Types: Cigarettes   • Smokeless tobacco: Never   Vaping Use   • Vaping Use: Never used   Substance Use Topics   • Alcohol use: Yes     Comment: pt. voiced he has been consuming "a lot" of alcohol lately   • Drug use: Never       Review of Systems   Constitutional: Negative for chills and fever. HENT: Negative for ear pain, hearing loss, sore throat, trouble swallowing and voice change. Eyes: Negative for pain and discharge. Respiratory: Negative for cough, shortness of breath and wheezing. Cardiovascular: Negative for chest pain and palpitations. Gastrointestinal: Positive for abdominal pain, anorexia, diarrhea and nausea. Negative for blood in stool, constipation, hematemesis and vomiting. Genitourinary: Negative for dysuria, flank pain, frequency and hematuria. Musculoskeletal: Negative for joint swelling, neck pain and neck stiffness. Skin: Negative for rash and wound. Neurological: Negative for dizziness, seizures, syncope, facial asymmetry and headaches. Psychiatric/Behavioral: Negative for hallucinations, self-injury and suicidal ideas.    All other systems reviewed and are negative. Physical Exam  Physical Exam  Vitals and nursing note reviewed. Constitutional:       General: He is not in acute distress. Appearance: He is well-developed. HENT:      Head: Normocephalic and atraumatic. Right Ear: External ear normal.      Left Ear: External ear normal.   Eyes:      General: No scleral icterus. Right eye: No discharge. Left eye: No discharge. Extraocular Movements: Extraocular movements intact. Conjunctiva/sclera: Conjunctivae normal.   Cardiovascular:      Rate and Rhythm: Normal rate and regular rhythm. Heart sounds: Normal heart sounds. No murmur heard. Pulmonary:      Effort: Pulmonary effort is normal.      Breath sounds: Normal breath sounds. No wheezing or rales. Abdominal:      General: Bowel sounds are normal. There is no distension. Palpations: Abdomen is soft. Tenderness: There is abdominal tenderness. There is no guarding or rebound. Comments: Tender in the epigastric region. Musculoskeletal:         General: No deformity. Normal range of motion. Cervical back: Normal range of motion and neck supple. Skin:     General: Skin is warm and dry. Findings: No rash. Neurological:      General: No focal deficit present. Mental Status: He is alert and oriented to person, place, and time. Cranial Nerves: No cranial nerve deficit. Psychiatric:         Mood and Affect: Mood normal.         Behavior: Behavior normal.         Thought Content:  Thought content normal.         Judgment: Judgment normal.         Vital Signs  ED Triage Vitals [10/10/23 1628]   Temperature Pulse Respirations Blood Pressure SpO2   98 °F (36.7 °C) 88 18 (!) 190/90 96 %      Temp Source Heart Rate Source Patient Position - Orthostatic VS BP Location FiO2 (%)   Oral Monitor Sitting Left arm --      Pain Score       9           Vitals:    10/10/23 1628   BP: (!) 190/90   Pulse: 88   Patient Position - Orthostatic VS: Sitting Visual Acuity      ED Medications  Medications   sodium chloride 0.9 % bolus 2,000 mL (2,000 mL Intravenous New Bag 10/10/23 1648)   ondansetron (ZOFRAN) injection 4 mg (4 mg Intravenous Given 10/10/23 1649)   pantoprazole (PROTONIX) injection 40 mg (40 mg Intravenous Given 10/10/23 1649)   morphine injection 2 mg (2 mg Intravenous Given 10/10/23 1649)   LORazepam (ATIVAN) injection 1 mg (1 mg Intravenous Given 10/10/23 1649)   iohexol (OMNIPAQUE) 350 MG/ML injection (MULTI-DOSE) 100 mL (100 mL Intravenous Given 10/10/23 1703)   fentanyl citrate (PF) 100 MCG/2ML 100 mcg (100 mcg Intravenous Given 10/10/23 1803)       Diagnostic Studies  Results Reviewed     Procedure Component Value Units Date/Time    Protime-INR [[id number]]  (Normal) Collected: 10/10/23 1642    Lab Status: Final result Specimen: Blood from Arm, Left Updated: 10/10/23 1730     Protime 12.7 seconds      INR 0.92    APTT [[id number]]  (Normal) Collected: 10/10/23 1642    Lab Status: Final result Specimen: Blood from Arm, Left Updated: 10/10/23 1730     PTT 24 seconds     Lactic acid, plasma (w/reflex if result > 2.0) [[id number]]  (Abnormal) Collected: 10/10/23 1642    Lab Status: Final result Specimen: Blood from Arm, Left Updated: 10/10/23 1723     LACTIC ACID 2.3 mmol/L     Narrative:      Result may be elevated if tourniquet was used during collection.     Lactic acid 2 Hours [[id number]]     Lab Status: No result Specimen: Blood     Urine Microscopic [[id number]]  (Normal) Collected: 10/10/23 1648    Lab Status: Final result Specimen: Urine, Clean Catch Updated: 10/10/23 1723     RBC, UA None Seen /hpf      WBC, UA 0-1 /hpf      Epithelial Cells None Seen /hpf      Bacteria, UA None Seen /hpf     Comprehensive metabolic panel [[id number]]  (Abnormal) Collected: 10/10/23 1642    Lab Status: Final result Specimen: Blood from Arm, Left Updated: 10/10/23 1719     Sodium 135 mmol/L      Potassium 4.2 mmol/L      Chloride 103 mmol/L      CO2 23 mmol/L      ANION GAP 9 mmol/L      BUN 10 mg/dL      Creatinine 0.73 mg/dL      Glucose 138 mg/dL      Calcium 8.8 mg/dL      AST 65 U/L       U/L      Alkaline Phosphatase 45 U/L      Total Protein 7.0 g/dL      Albumin 4.1 g/dL      Total Bilirubin 0.57 mg/dL      eGFR 116 ml/min/1.73sq m     Narrative:      [facility] guidelines for Chronic Kidney Disease (CKD):   •  Stage 1 with normal or high GFR (GFR > 90 mL/min/1.73 square meters)  •  Stage 2 Mild CKD (GFR = 60-89 mL/min/1.73 square meters)  •  Stage 3A Moderate CKD (GFR = 45-59 mL/min/1.73 square meters)  •  Stage 3B Moderate CKD (GFR = 30-44 mL/min/1.73 square meters)  •  Stage 4 Severe CKD (GFR = 15-29 mL/min/1.73 square meters)  •  Stage 5 End Stage CKD (GFR <15 mL/min/1.73 square meters)  Note: GFR calculation is accurate only with a steady state creatinine    Magnesium [[id number]]  (Normal) Collected: 10/10/23 1642    Lab Status: Final result Specimen: Blood from Arm, Left Updated: 10/10/23 1719     Magnesium 1.9 mg/dL     Lipase [[id number]]  (Abnormal) Collected: 10/10/23 1642    Lab Status: Final result Specimen: Blood from Arm, Left Updated: 10/10/23 1719     Lipase 3,832 u/L     Ethanol [[id number]]  (Normal) Collected: 10/10/23 1642    Lab Status: Final result Specimen: Blood from Arm, Left Updated: 10/10/23 1719     Ethanol Lvl <10 mg/dL     Rapid drug screen, urine [[id number]]  (Normal) Collected: 10/10/23 1647    Lab Status: Final result Specimen: Urine, Clean Catch Updated: 10/10/23 1716     Amph/Meth UR Negative     Barbiturate Ur Negative     Benzodiazepine Urine Negative     Cocaine Urine Negative     Methadone Urine Negative     Opiate Urine Negative     PCP Ur Negative     THC Urine Negative     Oxycodone Urine Negative    Narrative:      FOR MEDICAL PURPOSES ONLY. IF CONFIRMATION NEEDED PLEASE CONTACT THE LAB WITHIN 5 DAYS.     Drug Screen Cutoff Levels:  AMPHETAMINE/METHAMPHETAMINES  1000 ng/mL  BARBITURATES     200 ng/mL  BENZODIAZEPINES     200 ng/mL  COCAINE      300 ng/mL  METHADONE      300 ng/mL  OPIATES      300 ng/mL  PHENCYCLIDINE     25 ng/mL  THC       50 ng/mL  OXYCODONE      100 ng/mL    HS Troponin I 2hr [[id number]]     Lab Status: No result Specimen: Blood     HS Troponin 0hr (reflex protocol) [[id number]]  (Normal) Collected: 10/10/23 1642    Lab Status: Final result Specimen: Blood from Arm, Left Updated: 10/10/23 1713     hs TnI 0hr 3 ng/L     UA w Reflex to Microscopic w Reflex to Culture [[id number]]  (Abnormal) Collected: 10/10/23 1648    Lab Status: Final result Specimen: Urine, Clean Catch Updated: 10/10/23 1706     Color, UA Yellow     Clarity, UA Clear     Specific Gravity, UA >=1.030     pH, UA 5.5     Leukocytes, UA Negative     Nitrite, UA Negative     Protein, UA 30 (1+) mg/dl      Glucose, UA Negative mg/dl      Ketones, UA Negative mg/dl      Urobilinogen, UA 0.2 E.U./dl      Bilirubin, UA Small     Occult Blood, UA Negative    CBC and differential [[id number]]  (Abnormal) Collected: 10/10/23 1642    Lab Status: Final result Specimen: Blood from Arm, Left Updated: 10/10/23 1648     WBC 11.11 Thousand/uL      RBC 5.21 Million/uL      Hemoglobin 16.4 g/dL      Hematocrit 46.3 %      MCV 89 fL      MCH 31.5 pg      MCHC 35.4 g/dL      RDW 13.7 %      MPV 10.0 fL      Platelets 451 Thousands/uL      nRBC 0 /100 WBCs      Neutrophils Relative 76 %      Immat GRANS % 1 %      Lymphocytes Relative 15 %      Monocytes Relative 7 %      Eosinophils Relative 1 %      Basophils Relative 0 %      Neutrophils Absolute 8.48 Thousands/µL      Immature Grans Absolute 0.06 Thousand/uL      Lymphocytes Absolute 1.68 Thousands/µL      Monocytes Absolute 0.78 Thousand/µL      Eosinophils Absolute 0.07 Thousand/µL      Basophils Absolute 0.04 Thousands/µL                  CT abdomen pelvis with contrast   Final Result by Concetta Wolf MD (10/10 2206)      Peripancreatic inflammatory changes consistent with acute pancreatitis. No evidence of necrosis or pseudocyst formation. Fatty liver. Workstation performed: [id number]                    Procedures  ECG 12 Lead Documentation Only    Date/Time: 10/10/2023 4:35 PM    Performed by: Cielo Smith MD  Authorized by: Cielo Smith MD    ECG reviewed by me, the ED Provider: yes    Patient location:  ED  Rate:     ECG rate:  80  Rhythm:     Rhythm: sinus rhythm    Ectopy:     Ectopy: none    QRS:     QRS axis:  Normal             ED Course  ED Course as of 10/10/23 1803   Tue Oct 10, 2023   1736 LACTIC ACID(!!): 2.3  Secondary to pancreatitis. 1739 Discussed with patient about possible detox at [facility].  Patient is receptive to this. Made aware that this is a closed unit. 1800 Accepted at [facility] for detox under Dr. Denise Escobar and/or Complexity of Data Reviewed  Labs: ordered. Decision-making details documented in ED Course. Radiology: ordered. Decision-making details documented in ED Course. ECG/medicine tests: ordered and independent interpretation performed. Decision-making details documented in ED Course. Discussion of management or test interpretation with external provider(s): Differential diagnosis includes but not limited to STEMI, NSTEMI, cholelithiasis, cholecystitis, peptic ulcer disease, gastritis, pancreatitis, diverticulitis, colitis, bowel obstruction, appendicitis, UTI, ureteral stone, kidney stone, inflammatory bowel disease. Risk  Prescription drug management.                Disposition  Final diagnoses:   Alcohol-induced acute pancreatitis without infection or necrosis     Time reflects when diagnosis was documented in both MDM as applicable and the Disposition within this note     Time User Action Codes Description Comment    10/10/2023  5:30 PM Cielo Smith Add [K85.20] Alcohol-induced acute pancreatitis without infection or necrosis       ED Disposition     ED Disposition   Transfer to Another Facility-In Network    Condition   --    Date/Time   Tue Oct 10, 2023  6:00 PM    Comment   [place] should be transferred out to [facility]. MD Documentation    Nette Alexandra Most Recent Value   Patient Condition The patient has been stabilized such that within reasonable medical probability, no material deterioration of the patient condition or the condition of the unborn child(era) is likely to result from the transfer   Reason for Transfer Level of Care needed not available at this facility   Benefits of Transfer Specialized equipment and/or services available at the receiving facility (Include comment)________________________   Risks of Transfer Potential for delay in receiving treatment, Potential deterioration of medical condition, Loss of IV, Possible worsening of condition or death during transfer, Increased discomfort during transfer   Accepting Physician [address] [facility] Name, Yazmin & Susi Botello. [facility] AND [facility] (Name & Tel number) Tox [place] and [address]   Sending MD Meredith Noguera   Provider Certification General risk, such as traffic hazards, adverse weather conditions, rough terrain or turbulence, possible failure of equipment (including vehicle or aircraft), or consequences of actions of persons outside the control of the transport personnel, Unanticipated needs of medical equipment and personnel during transport, Risk of worsening condition, The possibility of a transport vehicle being unavailable      RN Documentation    Flowsheet Row Most [facility] Name, Yazmin & Susi Botello. [facility] (Name & Tel number) Tox [address] and [address]      Follow-up Information    None         Patient's Medications   Discharge Prescriptions    No medications on file       No discharge procedures on file.     PDMP Review     None          ED Provider  Electronically Signed by           Julia Pop MD  10/10/23 2529

## 2023-10-10 NOTE — EMTALA/ACUTE CARE TRANSFER
[address] [place] [address]  Dept: [phone]      EMTALA TRANSFER CONSENT    NAME Lyn BAILEY 1985                              MRN [id number]    I have been informed of my rights regarding examination, treatment, and transfer   by Dr. Shade Washington MD    Benefits: Specialized equipment and/or services available at the receiving facility (Include comment)________________________    Risks: Potential for delay in receiving treatment, Potential deterioration of medical condition, Loss of IV, Possible worsening of condition or death during transfer, Increased discomfort during transfer          I authorize the performance of emergency medical procedures and treatments upon me in both transit and upon arrival at the receiving facility. Additionally, I authorize the release of any and all medical records to the receiving facility and request they be transported with me, if possible. I understand that the safest mode of transportation during a medical emergency is an ambulance and that the Hospital advocates the use of this mode of transport. Risks of traveling to the receiving facility by car, including absence of medical control, life sustaining equipment, such as oxygen, and medical personnel has been explained to me and I fully understand them. (DIANE CORRECT BOX BELOW)  [  ]  I consent to the stated transfer and to be transported by ambulance/helicopter. [  ]  I consent to the stated transfer, but refuse transportation by ambulance and accept full responsibility for my transportation by car.   I understand the risks of non-ambulance transfers and I exonerate the Hospital and its staff from any deterioration in my condition that results from this refusal.    X___________________________________________    DATE  10/10/23  TIME________  Signature of patient or legally responsible individual signing on patient behalf           RELATIONSHIP TO PATIENT_________________________          Provider Certification    NAME Belén Ndiaye                                         1985                              MRN [id number]    A medical screening exam was performed on the above named patient. Based on the examination:    Condition Necessitating Transfer The encounter diagnosis was Alcohol-induced acute pancreatitis without infection or necrosis. Patient Condition: The patient has been stabilized such that within reasonable medical probability, no material deterioration of the patient condition or the condition of the unborn child(era) is likely to result from the transfer    Reason for Transfer: Level of Care needed not available at this facility    Transfer Requirements: [place]   · Space available and qualified personnel available for treatment as acknowledged by Jose GRAY and PAC  · Agreed to accept transfer and to provide appropriate medical treatment as acknowledged by       Madan  · Appropriate medical records of the examination and treatment of the patient are provided at the time of transfer   [address] _______  · Transfer will be performed by qualified personnel from    and appropriate transfer equipment as required, including the use of necessary and appropriate life support measures.     Provider Certification: I have examined the patient and explained the following risks and benefits of being transferred/refusing transfer to the patient/family:  General risk, such as traffic hazards, adverse weather conditions, rough terrain or turbulence, possible failure of equipment (including vehicle or aircraft), or consequences of actions of persons outside the control of the transport personnel, Unanticipated needs of medical equipment and personnel during transport, Risk of worsening condition, The possibility of a transport vehicle being unavailable      Based on these reasonable risks and benefits to the patient and/or the unborn child(era), and based upon the information available at the time of the patient’s examination, I certify that the medical benefits reasonably to be expected from the provision of appropriate medical treatments at another medical facility outweigh the increasing risks, if any, to the individual’s medical condition, and in the case of labor to the unborn child, from effecting the transfer.     X____________________________________________ DATE 10/10/23        TIME_______      ORIGINAL - SEND TO MEDICAL RECORDS   COPY - SEND WITH PATIENT DURING TRANSFER

## 2023-10-11 LAB
ALBUMIN SERPL BCP-MCNC: 4 G/DL (ref 3.5–5)
ALP SERPL-CCNC: 43 U/L (ref 34–104)
ALT SERPL W P-5'-P-CCNC: 106 U/L (ref 7–52)
ANION GAP SERPL CALCULATED.3IONS-SCNC: 7 MMOL/L
AST SERPL W P-5'-P-CCNC: 40 U/L (ref 13–39)
BILIRUB SERPL-MCNC: 0.76 MG/DL (ref 0.2–1)
BUN SERPL-MCNC: 8 MG/DL (ref 5–25)
CALCIUM SERPL-MCNC: 8.5 MG/DL (ref 8.4–10.2)
CHLORIDE SERPL-SCNC: 102 MMOL/L (ref 96–108)
CO2 SERPL-SCNC: 26 MMOL/L (ref 21–32)
CREAT SERPL-MCNC: 0.63 MG/DL (ref 0.6–1.3)
ERYTHROCYTE [DISTWIDTH] IN BLOOD BY AUTOMATED COUNT: 14 % (ref 11.6–15.1)
GFR SERPL CREATININE-BSD FRML MDRD: 124 ML/MIN/1.73SQ M
GLUCOSE SERPL-MCNC: 127 MG/DL (ref 65–140)
HCT VFR BLD AUTO: 48.4 % (ref 36.5–49.3)
HGB BLD-MCNC: 17 G/DL (ref 12–17)
LIPASE SERPL-CCNC: 1780 U/L (ref 11–82)
MAGNESIUM SERPL-MCNC: 2 MG/DL (ref 1.9–2.7)
MCH RBC QN AUTO: 31.7 PG (ref 26.8–34.3)
MCHC RBC AUTO-ENTMCNC: 35.1 G/DL (ref 31.4–37.4)
MCV RBC AUTO: 90 FL (ref 82–98)
PLATELET # BLD AUTO: 164 THOUSANDS/UL (ref 149–390)
PMV BLD AUTO: 10.4 FL (ref 8.9–12.7)
POTASSIUM SERPL-SCNC: 4.2 MMOL/L (ref 3.5–5.3)
PROT SERPL-MCNC: 6.7 G/DL (ref 6.4–8.4)
RBC # BLD AUTO: 5.36 MILLION/UL (ref 3.88–5.62)
SODIUM SERPL-SCNC: 135 MMOL/L (ref 135–147)
WBC # BLD AUTO: 12.53 THOUSAND/UL (ref 4.31–10.16)

## 2023-10-11 PROCEDURE — 85027 COMPLETE CBC AUTOMATED: CPT

## 2023-10-11 PROCEDURE — 83735 ASSAY OF MAGNESIUM: CPT

## 2023-10-11 PROCEDURE — 83690 ASSAY OF LIPASE: CPT

## 2023-10-11 PROCEDURE — 80053 COMPREHEN METABOLIC PANEL: CPT

## 2023-10-11 PROCEDURE — 99291 CRITICAL CARE FIRST HOUR: CPT

## 2023-10-11 RX ORDER — METOPROLOL TARTRATE 50 MG/1
50 TABLET, FILM COATED ORAL EVERY 12 HOURS SCHEDULED
Status: DISCONTINUED | OUTPATIENT
Start: 2023-10-11 | End: 2023-10-12 | Stop reason: HOSPADM

## 2023-10-11 RX ORDER — LISINOPRIL 10 MG/1
10 TABLET ORAL DAILY
Status: DISCONTINUED | OUTPATIENT
Start: 2023-10-11 | End: 2023-10-12 | Stop reason: HOSPADM

## 2023-10-11 RX ORDER — PHENOBARBITAL SODIUM 130 MG/ML
260 INJECTION INTRAMUSCULAR ONCE
Status: COMPLETED | OUTPATIENT
Start: 2023-10-11 | End: 2023-10-11

## 2023-10-11 RX ORDER — PHENOBARBITAL SODIUM 130 MG/ML
130 INJECTION INTRAMUSCULAR ONCE
Status: COMPLETED | OUTPATIENT
Start: 2023-10-11 | End: 2023-10-11

## 2023-10-11 RX ORDER — DEXTROSE AND SODIUM CHLORIDE 5; .9 G/100ML; G/100ML
100 INJECTION, SOLUTION INTRAVENOUS CONTINUOUS
Status: DISCONTINUED | OUTPATIENT
Start: 2023-10-11 | End: 2023-10-12 | Stop reason: HOSPADM

## 2023-10-11 RX ORDER — ACETAMINOPHEN 325 MG/1
650 TABLET ORAL EVERY 6 HOURS PRN
Status: DISCONTINUED | OUTPATIENT
Start: 2023-10-11 | End: 2023-10-12 | Stop reason: HOSPADM

## 2023-10-11 RX ADMIN — ENOXAPARIN SODIUM 40 MG: 40 INJECTION SUBCUTANEOUS at 08:11

## 2023-10-11 RX ADMIN — KETOROLAC TROMETHAMINE 15 MG: 30 INJECTION, SOLUTION INTRAMUSCULAR; INTRAVENOUS at 05:15

## 2023-10-11 RX ADMIN — SODIUM CHLORIDE 100 ML/HR: 0.9 INJECTION, SOLUTION INTRAVENOUS at 09:50

## 2023-10-11 RX ADMIN — NICOTINE 1 PATCH: 21 PATCH, EXTENDED RELEASE TRANSDERMAL at 08:12

## 2023-10-11 RX ADMIN — DEXTROSE AND SODIUM CHLORIDE 100 ML/HR: 5; .9 INJECTION, SOLUTION INTRAVENOUS at 17:22

## 2023-10-11 RX ADMIN — MORPHINE SULFATE 6 MG: 10 INJECTION INTRAVENOUS at 08:04

## 2023-10-11 RX ADMIN — PHENOBARBITAL SODIUM 130 MG: 130 INJECTION INTRAMUSCULAR at 23:04

## 2023-10-11 RX ADMIN — MULTIPLE VITAMINS W/ MINERALS TAB 1 TABLET: TAB ORAL at 08:11

## 2023-10-11 RX ADMIN — METOPROLOL TARTRATE 50 MG: 50 TABLET, FILM COATED ORAL at 20:26

## 2023-10-11 RX ADMIN — LISINOPRIL 10 MG: 10 TABLET ORAL at 08:11

## 2023-10-11 RX ADMIN — PHENOBARBITAL SODIUM 260 MG: 130 INJECTION INTRAMUSCULAR at 20:26

## 2023-10-11 RX ADMIN — PHENOBARBITAL SODIUM 130 MG: 130 INJECTION INTRAMUSCULAR; INTRAVENOUS at 19:30

## 2023-10-11 RX ADMIN — MORPHINE SULFATE 6 MG: 10 INJECTION INTRAVENOUS at 13:24

## 2023-10-11 RX ADMIN — FOLIC ACID: 5 INJECTION, SOLUTION INTRAMUSCULAR; INTRAVENOUS; SUBCUTANEOUS at 10:21

## 2023-10-11 RX ADMIN — KETOROLAC TROMETHAMINE 15 MG: 30 INJECTION, SOLUTION INTRAMUSCULAR; INTRAVENOUS at 23:04

## 2023-10-11 RX ADMIN — MORPHINE SULFATE 6 MG: 10 INJECTION INTRAVENOUS at 19:28

## 2023-10-11 RX ADMIN — METOPROLOL TARTRATE 50 MG: 50 TABLET, FILM COATED ORAL at 08:10

## 2023-10-11 RX ADMIN — PHENOBARBITAL SODIUM 260 MG: 130 INJECTION INTRAMUSCULAR; INTRAVENOUS at 05:15

## 2023-10-11 RX ADMIN — PHENOBARBITAL SODIUM 130 MG: 130 INJECTION INTRAMUSCULAR at 02:28

## 2023-10-11 NOTE — ASSESSMENT & PLAN NOTE
Lab Results   Component Value Date     (H) 10/10/2023    AST 65 (H) 10/10/2023    ALKPHOS 45 10/10/2023    TBILI 0.57 10/10/2023        · Elevated LFTs on admission as above  · Likely 2/2 chronic alcohol use/alcoholic liver disease  · Pt endorses epigastric pain radiating around both sides of the upper abdomen to the back/flanks bilaterally in the setting of acute pancreatitis   · Initiate IVFs  · CT A/P 10/10 showed hepatic steatosis without mass, abnormal contours, or biliary duct dilation  · Continue monitoring CMP  · Encourage alcohol cessation  · May consider RUQ U/S imaging if LFTs continue to trend up, or pt develops worsening focal RUQ pain/worsening GI sx, or inability to tolerate PO intake

## 2023-10-11 NOTE — PROGRESS NOTES
10/11/23 1419   Referral Data   Referral Source Patient   Referral Name  [address] [facility] ED   Referral Reason Drug/Alcohol [address] Res[address] [facility] [address]   Readmission Root Cause   30 Day Readmission No   Patient Information   Mental Status Alert   Primary Caregiver Self   Support System Immediate family   Hindu/Cultural Requests Denies   Legal Information   Legal Issues Denies   Activities of Daily Living Prior to Admission   Functional Status Independent   Assistive Device No device needed   Living Arrangement House;Lives with someone   Ambulation Independent   Access to Firearms   Access to Firearms No  (Pt denies)   Income Information   Income Source [facility]   Means of Transport to Appts: Drives Self        78/31/96 1420   Substance Abuse Addendum Details   History of Withdrawal Symptoms DT's;Other withdrawal symptoms (specify in comment)  (Nausea, anxiety, pain, tremor, diaphoresis, tachycardia, hypertension)   Medical Complications Hypertension, Pancreatitis   Sober Supports Mother, Sister   Present Treatment None   Substance Abuse Treatment Hx Past Tx, Inpatient; Past detox; Attends AA/NA   Stage of Change   Stage of Change Contemplation   Additional Substance Use Detail    Questions Responses   Problems Due to Past Use of Alcohol? Yes   Problems Due to Past Use of Substances? No   Substance Use Assessment Denies substance use within the past 12 months   Alcohol Use Frequency Daily   Alcohol Drink of Choice Whiskey, Beer   1st Use of Alcohol 12   Last Use of Alcohol & Amount 10/10 12am - 8 (16oz beers, 8 shots of whiskey)   Longest Abstinence from Alcohol 6 months   Pt is a 43yo male admitted to the withdrawal management unit for alcohol withdrawal. Pt initially presented to the [address] [place] [address] Drive ED for abdominal pain and subsequently requested detox from ETOH. Pt's name, date of birth, home address and telephone number were verified.  Pt was informed of case management role and the purpose of the completion of intake with case management. Pt presented as cooperative. Pt reports he has been drinking daily for the past 3-4 months. Pt reports drinking 8 (16oz) beers and 8 shots of whiskey daily. Pt repots first use at age 14 and last use on Tuesday 10/10 at 12am. Pt reports he smokes 1PPD and declined any referral to smoking cessation programs. Pt reports longest period of sobriety as 6 months. Pt reports one inpatient stay at [facility] inpatient and states he was non-compliant with OP follow up as he did not like the way Vivitrol made him feel. Pt reports blacking out "a couple times a week". Pt reports current withdrawal symptoms of nausea, anxiety, pain, tremor, diaphoresis, tachycardia and hypertension. Pt reports history of AA attendance and states AA meetings are not always helpful for him. Pt denies family history of IVONE. Pt states he is interested in following up with IOP upon discharge and declined inpatient referrals at this time. UDS: Negative  Audit: not completed  PAWSS: 5  Ethanol: <10    Pt denies history of mental health diagnoses. Pt denies any previous inpatient/outpatient treatment, any family history or any trauma history. Pt denies current SI/HI/AVH. Pt denies access to firearms. Pt has current chronic medical conditions of hypertension and pancreatitis. Pt has current PCP of Dr. Columba Zarate. Pt signed NELLIE for same and office was called at [phone] and notified of admission. Pt reports current insurance of [employer] and [facility]. NELLIE's signed for both. Pt reports preferred pharmacy of [facility] [place]. Pt denies any legal issues. 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 Pt identified highest level of education as high school. Pt denies  history or spiritual/Protestant beliefs. Pt confirms he drives himself and has a car. Pt reports he lives at [address] [place] [address], [facility]. Pt currently lives with his mother and can return upon discharge. Pt identified his supports as his mother and his sister but declined to sign any supportive contacts at this time and states he will update his family from his cell phone. Pt states he does have an 10yo daughter who is currently in the care of her mother. Pt declined to complete relapse prevention plan. Clinical impression: Pt appears ambivalent regarding his aftercare follow up. Pt was unable to identify reasons for previous non-compliance with outpatient and appears to minimize his alcohol use. Pt's barriers to treatment are his minimal external supports and his financial difficulties. Pt's goals for detox are to successfully complete medical withdrawal and to develop a discharge plan that includes relapse prevention education. Pt presents in the contemplation stage of change.

## 2023-10-11 NOTE — PLAN OF CARE
Problem: SUBSTANCE USE/ABUSE  Goal: By discharge, will develop insight into their chemical dependency and sustain motivation to continue in recovery  Description: INTERVENTIONS:  - Attends all daily group sessions and scheduled AA groups  - Actively practices coping skills through participation in the therapeutic community and adherence to program rules  - Reviews and completes assignments from individual treatment plan  - Assist patient development of understanding of their personal cycle of addiction and relapse triggers  Outcome: Progressing  Goal: By discharge, patient will have ongoing treatment plan addressing chemical dependency  Description: INTERVENTIONS:  - Assist patient with resources and/or appointments for ongoing recovery based living  Outcome: Progressing     Problem: PAIN - ADULT  Goal: Verbalizes/displays adequate comfort level or baseline comfort level  Description: Interventions:  - Encourage patient to monitor pain and request assistance  - Assess pain using appropriate pain scale  - Administer analgesics based on type and severity of pain and evaluate response  - Implement non-pharmacological measures as appropriate and evaluate response  - Consider cultural and social influences on pain and pain management  - Notify physician/advanced practitioner if interventions unsuccessful or patient reports new pain  Outcome: Progressing

## 2023-10-11 NOTE — ASSESSMENT & PLAN NOTE
· Patient with a history of chronic daily alcohol use  · Last drink 10/9 around 0000  · Serum alcohol <10 in the ED  · Received Ativan 1 mg in the ED PTA  · Initiate SEWS protocol for medical management of alcohol withdrawal  · Current alcohol withdrawal signs/symptoms include anxiety, tremor, diaphoresis, nausea, tachycardia, HTN  · SEWS score 11 upon admission  · Received 260 mg of phenobarbital as initial dose + subsequent dose of 130 mg IV per protocol for repeat SEWS score of 10  · Continue monitoring under protocol and administer phenobarbital as indicated  · Continuous pulse ox and telemetry monitoring

## 2023-10-11 NOTE — ASSESSMENT & PLAN NOTE
Lab Results   Component Value Date    ALT 59 (H) 10/12/2023    AST 23 10/12/2023    ALKPHOS 35 10/12/2023    TBILI 1.33 (H) 10/12/2023        AST and ALT improving, bilirubin with slight uptrend, continue ot monitor CMP  Likely 2/2 chronic alcohol use/alcoholic liver disease  CT A/P 10/10 showed hepatic steatosis without mass, abnormal contours, or biliary duct dilation  Encourage alcohol cessation

## 2023-10-11 NOTE — UTILIZATION REVIEW
NOTIFICATION OF INPATIENT ADMISSION   AUTHORIZATION REQUEST   SERVICING FACILITY:   [address] [place] [address]  Tax ID: [id number]  NPI: [id number] ATTENDING PROVIDER:  Attending Name and NPI#: Edithcassie Mathewin [[id number]]  Address: [address] [place] [address]  Phone: [phone]     ADMISSION INFORMATION:  Place of Service: Inpatient [address]  Place of Service Code: 21  Inpatient Admission Date/Time: 10/10/23 11:12 PM  Discharge Date/Time: No discharge date for patient encounter. Admitting Diagnosis Code/Description:  Alcohol-induced acute pancreatitis without infection or necrosis [K85.20]     UTILIZATION REVIEW CONTACT:  Kristina Ritter Utilization   Network Utilization Review Department  Phone: [phone]  Fax [phone]  Email: [email or web address]  Contact for approvals/pending authorizations, clinical reviews, and discharge. PHYSICIAN ADVISORY SERVICES:  Medical Necessity Denial & Mzlc-ef-Ymvi Review  Phone: [phone]  Fax: [phone]  Email: [email or web address]     DISCHARGE SUPPORT TEAM:  For Patients Discharge Needs & Updates  Phone: [phone] opt. 2 Fax: [phone]  Email: [email or web address]

## 2023-10-11 NOTE — ASSESSMENT & PLAN NOTE
Pt with a hx of anxiety  Home medication per chart review of Atarax 50 mg Q6H PRN anxiety   Reports he takes it at least once per day QHS for sleep and intermittently during the day as needed  Denies SI/HI/thoughts of self harm  Will hold Atarax while pt is undergoing acute alcohol withdrawal treatment with phenobarbital   May resume once w/d has stabilized/improved

## 2023-10-11 NOTE — ASSESSMENT & PLAN NOTE
· Pt with a h/o chronic heavy alcohol use  · Drinks 8 beers (16 oz) + 8 shots of whiskey daily  · Denies H/o withdrawal seizures  · Hx of prior Vivitrol MAT for AUD, with adverse reaction of feeling unwell for most of the month when he was on this medication   · Declines naltrexone at this time   · Expresses potential interested in acamprosate as alternative MAT  · Withdrawal management as above  · Initiate IVFs, thiamine/folic acid supplementation, and MVI  · Consult case management for assistance with aftercare resources - pt contemplating inpatient v. Outpatient rehab at this time

## 2023-10-11 NOTE — UTILIZATION REVIEW
Initial Clinical Review    Pt initially presented to [facility] ED. Pt was transferred by EMS to [facility] for its Level IV medically managed intensive inpatient detox unit, not available at [address] [facility]. Admission: Date/Time/Statement:   Admission Orders (From admission, onward)       Ordered        10/10/23 2321  Inpatient Admission  Once                          Orders Placed This Encounter   Procedures    Inpatient Admission     Standing Status:   Standing     Number of Occurrences:   1     Order Specific Question:   Level of Care     Answer:   Med Surg [16]     Order Specific Question:   Estimated length of stay     Answer:   More than 2 Midnights     Order Specific Question:   Certification     Answer:   I certify that inpatient services are medically necessary for this patient for a duration of greater than two midnights. See H&P and MD Progress Notes for additional information about the patient's course of treatment. Initial Presentation: 43 y.o. male who presented to medical detox. Inpatient admission for evaluation and treatment of alcohol withdrawal syndrome. Presented w/ need for detox from alcohol and alcohol induced pancreatitis. Reports eight 16 oz beers and 8 shots of whiskey daily, last drink on 10/10 @ just after midnight. Has prior rehab treatment for withdrawal. Reports no hx of withdrawal seizures. On exam, abdominal tenderness, HTN, tachycardic. SEWS 11. Plan: SEWS monitoring w/ phenobarbital management, IV thiamine/folic acid supplement, IVF, clear liquids, analgesics, telemetry, continuous pulse ox, continue PTA meds, trend labs, replete electrolytes as needed. Date: 10/11/23       Day 2: On exam, tachycardic, diffuse abdominal tenderness. SEWS 0.  Plan: continue SEWS monitoring w/ phenobarbital management, IVF, NPO, analgesics, IV thiamine/folic acid supplement, telemetry, continuous pulse ox, continue PTA meds, trend labs, replete electrolytes as needed. Wt Readings from Last 1 Encounters:   10/10/23 118 kg (260 lb)     Vital Signs:   Date/Time Temp Pulse Resp BP MAP (mmHg) SpO2 Calculated FIO2 (%) - Nasal Cannula Nasal Cannula O2 Flow Rate (L/min) O2 Device   10/11/23 0806 98.7 °F (37.1 °C) 117 Abnormal  19 161/106 Abnormal  -- 95 % 28 2 L/min Nasal cannula   10/11/23 0507 97.6 °F (36.4 °C) 111 Abnormal  18 167/116 Abnormal  -- 93 % -- -- None (Room air)   10/11/23 0155 97.7 °F (36.5 °C) 110 Abnormal  18 165/115 Abnormal  -- 97 % 28 2 L/min Nasal cannula   10/10/23 2322 -- -- -- -- -- -- -- -- None (Room air)   10/10/23 2300 97 °F (36.1 °C) Abnormal  112 Abnormal  18 166/116 Abnormal  132 94 % -- -- None (Room air)       Severity of Ethanol Withdrawal Scale (SEWS):   Row Name 10/11/23 0800 10/11/23 0700 10/11/23 0508 10/11/23 0156 10/10/23 2322   Severity of Ethanol Withdrawal Scale (SEWS)   ANXIETY: Do you feel that something bad is about to happen to you right now? 0  -MD 0  -MD 3  -DE 3  -DE 3  -DE   NAUSEA and DRY HEAVES or VOMITING? 0  -MD 0  -MD 3  -DE 0  -DE 3  -DE   SWEATING: (includes moist palms, sweating now)? Score 0 or 2 0  -MD 0  -MD 0  -DE 2  -DE 0  -DE   TREMOR: with arms extended eyes closed? 0  -MD 0  -MD 2  -DE 2  -DE 2  -DE   AGITATION: Fidgety, restless, pacing? 0  -MD 0  -MD 0  -DE 0  -DE 0  -DE   DISORIENTATION: 0  -MD 0  -MD 0  -DE 0  -DE 0  -DE   HALLUCINATIONS: 0  -MD 0  -MD 0  -DE 0  -DE 0  -DE   VITAL SIGNS: ANY (Pulse >416, Diastolic BP >17, Temp >29.1) 0  -MD 0  -MD 3  -DE 3  -DE 3  -DE   SEWS Total Score 0  -MD 0  -MD 11  -DE 10  -DE 11  -DE         Pertinent Labs/Diagnostic Test Results:   10/10 - CT AP  Peripancreatic inflammatory changes consistent with acute pancreatitis. No evidence of necrosis or pseudocyst formation. Fatty liver.        Results from last 7 days   Lab Units 10/11/23  0447 10/10/23  1642   WBC Thousand/uL 12.53* 11.11*   HEMOGLOBIN g/dL 17.0 16.4   HEMATOCRIT % 48.4 46.3   PLATELETS Thousands/uL 164 175   NEUTROS ABS Thousands/µL  --  8.48*         Results from last 7 days   Lab Units 10/11/23  0447 10/10/23  1642   SODIUM mmol/L 135 135   POTASSIUM mmol/L 4.2 4.2   CHLORIDE mmol/L 102 103   CO2 mmol/L 26 23   ANION GAP mmol/L 7 9   BUN mg/dL 8 10   CREATININE mg/dL 0.63 0.73   EGFR ml/min/1.73sq m 124 116   CALCIUM mg/dL 8.5 8.8   MAGNESIUM mg/dL 2.0 1.9     Results from last 7 days   Lab Units 10/11/23  0447 10/10/23  1642   AST U/L 40* 65*   ALT U/L 106* 134*   ALK PHOS U/L 43 45   TOTAL PROTEIN g/dL 6.7 7.0   ALBUMIN g/dL 4.0 4.1   TOTAL BILIRUBIN mg/dL 0.76 0.57         Results from last 7 days   Lab Units 10/11/23  0447 10/10/23  1642   GLUCOSE RANDOM mg/dL 127 138       Results from last 7 days   Lab Units 10/10/23  1842 10/10/23  1642   HS TNI 0HR ng/L  --  3   HS TNI 2HR ng/L 4  --    HSTNI D2 ng/L 1  --          Results from last 7 days   Lab Units 10/10/23  1642   PROTIME seconds 12.7   INR  0.92   PTT seconds 24             Results from last 7 days   Lab Units 10/10/23  1842 10/10/23  1642   LACTIC ACID mmol/L 1.3 2.3*       Results from last 7 days   Lab Units 10/11/23  0447 10/10/23  1642   LIPASE u/L 1,780* 3,832*         Results from last 7 days   Lab Units 10/10/23  1648   CLARITY UA  Clear   COLOR UA  Yellow   SPEC GRAV UA  >=1.030   PH UA  5.5   GLUCOSE UA mg/dl Negative   KETONES UA mg/dl Negative   BLOOD UA  Negative   PROTEIN UA mg/dl 30 (1+)*   NITRITE UA  Negative   BILIRUBIN UA  Small*   UROBILINOGEN UA E.U./dl 0.2   LEUKOCYTES UA  Negative   WBC UA /hpf 0-1   RBC UA /hpf None Seen   BACTERIA UA /hpf None Seen   EPITHELIAL CELLS WET PREP /hpf None Seen             Results from last 7 days   Lab Units 10/10/23  1647   AMPH/METH  Negative   BARBITURATE UR  Negative   BENZODIAZEPINE UR  Negative   COCAINE UR  Negative   METHADONE URINE  Negative   OPIATE UR  Negative   PCP UR  Negative   THC UR  Negative     Results from last 7 days   Lab Units 10/10/23  1642   ETHANOL LVL mg/dL <10                  Past Medical History:   Diagnosis Date    Anxiety     COVID-19     5/2022    History of alcohol abuse     pt stopped on 9/10/22    Hypertension     Umbilical hernia      Present on Admission:   Hypertension   Current smoker   Anxiety   Alcohol withdrawal syndrome with complication (HCC)   Alcohol-induced acute pancreatitis without infection or necrosis   Alcohol use disorder, severe, dependence (HCC)   Elevated LFTs   Leukocytosis      Admitting Diagnosis: Alcohol-induced acute pancreatitis without infection or necrosis [K85.20]  Age/Sex: 43 y.o. male  Admission Orders:  Clear Liquid Diet. --> NPO. SCDs. Fall & Seizure Precautions. SEWS monitoring. Telemetry & Continuous Pulse Ox. Scheduled Medications:  enoxaparin, 40 mg, Subcutaneous, Daily  folic acid 1 mg, thiamine (VITAMIN B1) 100 mg in sodium chloride 0.9 % 100 mL IV piggyback, , Intravenous, Daily  lisinopril, 10 mg, Oral, Daily  metoprolol tartrate, 50 mg, Oral, Q12H PIETRO  multivitamin-minerals, 1 tablet, Oral, Daily  nicotine, 1 patch, Transdermal, Daily      Continuous IV Infusions:  sodium chloride, 100 mL/hr, Intravenous, Continuous      PRN Meds:  acetaminophen, 650 mg, Oral, Q6H PRN  aluminum-magnesium hydroxide-simethicone, 30 mL, Oral, Q6H PRN  ketorolac, 15 mg, Intravenous, Q6H PRN; 10/11 x1  morphine injection, 6 mg, Intravenous, Q4H PRN; 10/10 x1, 10/11 x1  ondansetron, 4 mg, Intravenous, Q6H PRN  traZODone, 50 mg, Oral, HS PRN  phenobarbital, 260 mg, Intravenous; 10/10 x1, 10/11 x1  phenobarbital, 130 mg, Intravenous; 10/11 x1        IP CONSULT TO CASE MANAGEMENT    Network Utilization Review Department  ATTENTION: Please call with any questions or concerns to [phone] and carefully listen to the prompts so that you are directed to the right person. All voicemails are confidential.   For Discharge needs, contact Care Management DC Support Team at [phone] opt.  2  Send all requests for admission clinical reviews, approved or denied determinations and any other requests to dedicated fax number below belonging to the campus where the patient is receiving treatment.  List of dedicated fax numbers for the Facilities:  [place] DENIALS (Administrative/Medical Necessity) [phone]   DISCHARGE SUPPORT TEAM (NETWORK) [id number] Smyth (Maternity/NICU/Pediatrics) [phone]   [address] [place] [address] [address] [facility] [phone]   [address] [address] [facility] [address] [facility] [address] [phone]

## 2023-10-11 NOTE — ASSESSMENT & PLAN NOTE
Last drink 10/9 around 0000  Serum alcohol <10 in the ED  Continue SEWS protocol for medical management of alcohol withdrawal  Most recent score 0  Likely to continue improving from that regard and be off protocol in less than 24 hours.    Continuous pulse ox and telemetry monitoring

## 2023-10-11 NOTE — ASSESSMENT & PLAN NOTE
· Pt with a hx of anxiety  · Home medication per chart review of Atarax 50 mg Q6H PRN anxiety   · Reports he takes it at least once per day QHS for sleep and intermittently during the day as needed  · Denies SI/HI/thoughts of self harm  · Will hold Atarax while pt is undergoing acute alcohol withdrawal treatment with phenobarbital   · May resume once w/d has stabilized/improved

## 2023-10-11 NOTE — ASSESSMENT & PLAN NOTE
Leukocytosis monitored in the setting of ETOH wd and ETOH pancreatitis, increasing. No initial evidence of active infection but fever present today, concern exists for possible PNA despite negative CXR, possible viral infection, worsening pancreatitis. Follow up additional diagnostics: CT chest, abdomen, pelvis, blood cultures, lactate, procal, viral studies. Empiric antibiotics started: ceftriaxone, will adjust further per additional findings.   Continue monitoring CBC, VS, symptoms [de-identified] : MORENO RICHARDSON was seen on January 5.  She had the severe right-sided acute otitis media with labyrinthitis in the fall that finally responded to myringotomy and antibiotics and from which she has been doing well.  However, 2 weeks ago she had significant nasal congestion with COVID and developed a fullness in the left ear.  She was treated with cefdinir and comes in for repeat evaluation.  The patient had no other ear nose or throat complaints at this visit.

## 2023-10-11 NOTE — ASSESSMENT & PLAN NOTE
Pt with a h/o chronic heavy alcohol use  Drinks 8 beers (16 oz) + 8 shots of whiskey daily  Denies H/o withdrawal seizures  Hx of prior Vivitrol MAT for AUD, with adverse reaction of feeling unwell for most of the month when he was on this medication   Declines naltrexone at this time   Expresses potential interested in acamprosate as alternative MAT  Withdrawal management as above  Continue IVFs, thiamine/folic acid supplementation, and MVI  Consult case management for assistance with aftercare resources - pt contemplating inpatient v. Outpatient rehab at this time

## 2023-10-11 NOTE — ASSESSMENT & PLAN NOTE
Lab Results   Component Value Date    WBC 11.11 (H) 10/10/2023        • Mildly Elevated WBC on admission  • Likely a leukemoid reaction 2/2 acute alcoholic pancreatitis with possible component of acute alcohol withdrawal as well  • No evidence of active infection   o Pt afebrile, VSS  o No CT evidence of acute pancreatic infection/fluid collection/pseudocyst/necrosis  • Continue monitoring CBC, VS, symptoms

## 2023-10-11 NOTE — ASSESSMENT & PLAN NOTE
· Pt initially presented to the ED c/o epigastric abdominal pain radiating to the back x2 days in the setting of chronic alcohol use  · Lipase elevated 3832  · CT A/P showed "Peripancreatic inflammatory changes consistent with acute pancreatitis" without evidence of acute infection, necrosis, or pseudocyst formation.    · Initiate IVFs  · Clear Liquid Diet, advance as tolerated  · Pain control- PRN Tylenol, Toradol, morphine IV at this time  · May consider low-dose Dilaudid for breakthrough pain if needed  · Continue trending lipase and monitoring sx

## 2023-10-11 NOTE — ASSESSMENT & PLAN NOTE
Patient with a history of HTN  Continue current home medication regimen: metoprolol 50 mg BID and lisinopril 10 mg daily  Most Recent Blood Pressure: 128/78

## 2023-10-11 NOTE — ASSESSMENT & PLAN NOTE
· Patient with a history of HTN  · Current home medication regimen: metoprolol 50 mg BID and lisinopril 10 mg daily  · BP hypertensive upon arrival to the unit in the setting of acute alcohol withdrawal, alcoholic pancreatitis with current pain and chronic HTN  Most Recent Blood Pressure: (!) 165/115  Will continue home medications at this time  · Continue monitoring BP

## 2023-10-11 NOTE — ASSESSMENT & PLAN NOTE
Abdominal pain/tenderness persists. Lipase continues to downtrend  Initial CT A/P showed "Peripancreatic inflammatory changes consistent with acute pancreatitis" without evidence of acute infection, necrosis, or pseudocyst formation. Continue IVF, clear liquid diet, advance as tolerated  Pain control- PRN Tylenol, morphine IV providing relieve.  DC toradol d/t additional risk of gastritis in setting of AUD  New evidence of pancreatic necrosis on CT

## 2023-10-11 NOTE — H&P
[facility]  H&P  Name: Lashon Gallegos y.o. male I MRN: [id number]  Unit/Bed#: 5T DETOX 515-01 I Date of Admission: 10/10/2023   Date of Service: 10/11/2023 I Hospital Day: 0  HISTORY & PHYSICAL EXAM  DEPARTMENT OF MEDICAL TOXICOLOGY  LEVEL 4 MEDICAL DETOX UNIT  Lashon Gallegos y.o. male MRN: [id number]  Unit/Bed#: 5T DETOX 515-01 Encounter: [id number]      Reason for Admission/Principal Problem: Ethanol withdrawal, Ethanol use disorder  Admitting Provider: Giana Garcia PA-C  Attending Provider: Jamia Strong DO   10/10/2023 11:12 PM        * Alcohol-induced acute pancreatitis without infection or necrosis  Assessment & Plan  · Pt initially presented to the ED c/o epigastric abdominal pain radiating to the back x2 days in the setting of chronic alcohol use  · Lipase elevated 3832  · CT A/P showed "Peripancreatic inflammatory changes consistent with acute pancreatitis" without evidence of acute infection, necrosis, or pseudocyst formation.    · Initiate IVFs  · Clear Liquid Diet, advance as tolerated  · Pain control- PRN Tylenol, Toradol, morphine IV at this time  · May consider low-dose Dilaudid for breakthrough pain if needed  · Continue trending lipase and monitoring sx       Alcohol withdrawal syndrome with complication [facility])  Assessment & Plan  · Patient with a history of chronic daily alcohol use  · Last drink 10/9 around 0000  · Serum alcohol <10 in the ED  · Received Ativan 1 mg in the ED PTA  · Initiate SEWS protocol for medical management of alcohol withdrawal  · Current alcohol withdrawal signs/symptoms include anxiety, tremor, diaphoresis, nausea, tachycardia, HTN  · SEWS score 11 upon admission  · Received 260 mg of phenobarbital as initial dose + subsequent dose of 130 mg IV per protocol for repeat SEWS score of 10  · Continue monitoring under protocol and administer phenobarbital as indicated  · Continuous pulse ox and telemetry monitoring    Alcohol use disorder, severe, dependence (HCC)  Assessment & Plan  · Pt with a h/o chronic heavy alcohol use  · Drinks 8 beers (16 oz) + 8 shots of whiskey daily  · Denies H/o withdrawal seizures  · Hx of prior Vivitrol MAT for AUD, with adverse reaction of feeling unwell for most of the month when he was on this medication   · Declines naltrexone at this time   · Expresses potential interested in acamprosate as alternative MAT  · Withdrawal management as above  · Initiate IVFs, thiamine/folic acid supplementation, and MVI  · Consult case management for assistance with aftercare resources - pt contemplating inpatient v. Outpatient rehab at this time    Hypertension  Assessment & Plan  · Patient with a history of HTN  · Current home medication regimen: metoprolol 50 mg BID and lisinopril 10 mg daily  · BP hypertensive upon arrival to the unit in the setting of acute alcohol withdrawal, alcoholic pancreatitis with current pain and chronic HTN  Most Recent Blood Pressure: (!) 165/115  Will continue home medications at this time  · Continue monitoring BP    Anxiety  Assessment & Plan  · Pt with a hx of anxiety  · Home medication per chart review of Atarax 50 mg Q6H PRN anxiety   · Reports he takes it at least once per day QHS for sleep and intermittently during the day as needed  · Denies SI/HI/thoughts of self harm  · Will hold Atarax while pt is undergoing acute alcohol withdrawal treatment with phenobarbital   · May resume once w/d has stabilized/improved    Leukocytosis  Assessment & Plan  Lab Results   Component Value Date    WBC 11.11 (H) 10/10/2023        • Mildly Elevated WBC on admission  • Likely a leukemoid reaction 2/2 acute alcoholic pancreatitis with possible component of acute alcohol withdrawal as well  • No evidence of active infection   o Pt afebrile, VSS  o No CT evidence of acute pancreatic infection/fluid collection/pseudocyst/necrosis  • Continue monitoring CBC, VS, symptoms    Elevated LFTs  Assessment & Plan  Lab Results   Component Value Date     (H) 10/10/2023    AST 65 (H) 10/10/2023    ALKPHOS 45 10/10/2023    TBILI 0.57 10/10/2023        · Elevated LFTs on admission as above  · Likely 2/2 chronic alcohol use/alcoholic liver disease  · Pt endorses epigastric pain radiating around both sides of the upper abdomen to the back/flanks bilaterally in the setting of acute pancreatitis   · Initiate IVFs  · CT A/P 10/10 showed hepatic steatosis without mass, abnormal contours, or biliary duct dilation  · Continue monitoring CMP  · Encourage alcohol cessation  · May consider RUQ U/S imaging if LFTs continue to trend up, or pt develops worsening focal RUQ pain/worsening GI sx, or inability to tolerate PO intake     Current smoker  Assessment & Plan  · Daily tobacco user   Offer NRT  Encourage cessation             VTE Prophylaxis: Enoxaparin (Lovenox)  / sequential compression device   Code Status: full code      Anticipated Length of Stay:  Patient will be admitted on an Inpatient basis with an anticipated length of stay of  >2  midnights. Justification for Hospital Stay: alcohol withdrawal, alcohol use disorder, acute alcohol-induced pancreatitis    For any questions or concerns, please Tiger Text the advanced practitioner in the role of [facility]-DETOX-AP On Call      This patient qualifies for Level IV medically managed intensive inpatient services under the criteria set by the American Society of Addiction Medicine, including dimensions 1-3.  The patient is in withdrawal (or is intoxicated with high risk of withdrawal), with severe and unstable medical and/or psychiatric (dual diagnosis) problems, requiring requires 24-hour medical and nursing care and the full resources of a licensed hospital.          [facility] patient to medical detox unit and continue supportive care and stabilization of acute ethanol withdrawal per medical toxicology/detox treatment pathway. Monitor ethanol withdrawal severity via the Severity of Ethanol Withdrawal Scale (SEWS) Q4 hours and then hourly if/when SEWS > 6. Treat withdrawal per pathway and reassess Q30-60 minutes. Mild SEWS Score 1-6  Administer medications* (IV or PO; PO preferred):  • If initial SEWS score: diazepam 10mg PO/IV x 1 AND phenobarbital 65 mg PO/IV x 1  • If repeat SEWS score 1-6: phenobarbital 65 mg PO/IV q1 hour x 5 doses maximum   Reassessment:   • SEWS q1 hour after each dose until SEWS 0 x 2 hours  • VS q1 hours (until SEWS 0, then q4 hours)  • Notify provider for bedside evaluation if 5-dose maximum is reached, RASS of -3 to -5, or SEWS score escalates to moderate or severe. Moderate SEWS Score 7-12  Administer medications* (IV):  • If initial SEWS score: diazepam 10mg IV x 1 AND phenobarbital 260 mg IV x 1  • If repeat SEWS score 7-12 or score escalated from mild: phenobarbital 130 mg IV q30 minutes x 5 doses maximum   Reassessment:  • SEWS q30 minutes after each dose until SEWS < 7 (then hourly until SEWS 0 x 2 hours)  • VS q30 minutes until SEWS < 7 (then hourly until SEWS 0, then q4 hours)  • Notify provider for bedside evaluation if 5-dose maximum is reached, RASS of -3 to -5, or SEWS score escalates to severe. Severe SEWS Score ? 13  Administer medications* (IV):  • If initial SEWS score: Diazepam 10 mg IV x 1 AND phenobarbital 650 mg IV piggyback x 1 over 15-30 minutes  • If repeat SEWS score ?  13 or score escalated from mild or moderate: phenobarbital 130 mg IV q30 minutes x 5 doses maximum   Reassessment:  • SEWS q30 minutes after each dose until SEWS < 7 (then hourly until SEWS 0 x 2 hours)   • VS q30 minutes until SEWS < 7 (then hourly until SEWS 0, then q4 hours)  • Notify provider for bedside evaluation if 5-dose maximum is reached or RASS of -3 to -5   *Hold medications and notify provider if CNS depression, respirations < 10/min, or RASS of -3 to -5.         Medications to be administered adjunctively if more than 2 grams of phenobarbital is needed for stabilization of withdrawal; require attending approval.   • Dexmedetomidine infusion 0.1-1mcg/kg/hr IV infusion, titratable to reduced agitation (Goal: RASS -2)  • Ketamine   o Acute agitated delirium: 1-2 mg/kg IV or 4-5 mg/kg IM  o Refractory withdrawal: 0.1-1mg/kg/hr IV infusion, titratable to reduced agitation (Goal: RASS -2)    Further evaluation, screening and treatment:  Evaluate complete metabolic panel, transaminases, INR, and lipase. Assess hepatic ultrasound for any sign of alcoholic liver disease or cirrhosis, and ultimately refer for further hepatic evaluation and care as/if indicated. Additional medications for ethanol associated malnutrition: Thiamine 100 mg IV daily, increase to 500 mg TID for signs/symptoms of Wernicke's Encephalopathy or Wernicke Korsakoff Syndrome   Folic acid 1 mg IV daily   Multivitamin PO daily      Will offer first monthly injection of Naltrexone 380 mg IM, once patient is stabilized, as it has been shown to assist in decreasing cravings for ethanol. Evaluate and treat for coexisting substance use, such as opioids and nicotine. Discuss risk factors for infectious disease, such as history of intravenous drug abuse, and offer hepatitis and HIV screening if indicated. Case management consultation to assist with coordination of subsequent treatment after discharge. Hx and PE limited by: none, pt alert and cooperative     HPI: Joel Quintero is a 43y.o. year old male with a PMH of AUD, HTN, anxiety who presents with alcohol withdrawal seeking detoxification.  Patient initially presented to the [facility] ED for acute epigastric pain in the setting of chronic alcohol use and was found to have acute alcoholic pancreatitis as evidenced by classic clinical presentation, elevated lipase of 3823, and findings suggestive of acute uncomplicated pancreatitis on CT abd/pelvis in the ED. He was subsequently agreeable to transfer to the [facility] medical detox unit for further medical management of alcohol withdrawal and acute alcoholic pancreatitis. Pt reports drinking 8 beers (16 oz) and 8 shots of whiskey daily, and his last drink was Tuesday around 12 AM. Serum alcohol was <10 in the ED. Upon assessment, he reports current alcohol withdrawal sx of anxiety (increased compared to baseline, nausea, and mild tremor, which has improved compared to initial presentation. He also endorses persistent epigastric pain radiating bilaterally around his upper abdomen to b/l flanks/back, which he states currently is a 6-7 out of 10 in severity, improved compared to a 9/10 upon arrival to the unit. Patient denies history of withdrawal seizures. He endorses a prior history of prior Vivitrol MAT, which he states did not make him feel well, so he declines restarting naltrexone but is potentially interested in acamprosate for ongoing alcohol MAT. Patient is currently contemplating inpatient v. Outpatient rehab after discharge. Preferred alcoholic beverage(s): beer, whiskey  Quantity and frequency of alcohol intake: 8 beers (16 oz) and 8 shots of whiskey daily  Use of any ethanol substitutes (toxic alcohols): no  Date/Time of last alcohol intake: Tuesday around 12 AM  Current signs and symptoms of ethanol withdrawal: anxiety, tremor, diaphoresis, tachycardia, hypertension and nausea    SEWS Total Score: 11 (10/11/2023  5:08 AM)          Ethanol Withdrawal History  Previous ethanol withdrawal? yes  Prior inpatient treatment for ethanol withdrawal? yes  Prior outpatient treatment for ethanol withdrawal? no  History of seizures with prior ethanol withdrawal? no  Prior treatment with naltrexone (Vivitrol)? yes  Current treatment with naltrexone (Vivitrol)? no  Other current treatment for ethanol use disorder?  no  Co-existing substance use? no    Review of PDMP: yes     Social History     Substance and Sexual Activity   Alcohol Use Yes    Comment: pt. voiced he has been consuming "a lot" of alcohol lately     Social History     Substance and Sexual Activity   Drug Use Never     Social History     Tobacco Use   Smoking Status Every Day   • Packs/day: 1.00   • Years: 20.00   • Total pack years: 20.00   • Types: Cigarettes   Smokeless Tobacco Never       Review of Systems   Constitutional: Positive for diaphoresis (intermittent, 2/2 alc WD, pt denies at present). Negative for appetite change, chills and fever. HENT: Negative for congestion, rhinorrhea and sore throat. Eyes: Negative for visual disturbance. Respiratory: Negative for cough and shortness of breath. Cardiovascular: Negative for chest pain and palpitations. Gastrointestinal: Positive for abdominal pain (epigastric radiating around both sides of upper abdomen to bilateral flanks/back), diarrhea and nausea. Negative for blood in stool and vomiting. Genitourinary: Negative for difficulty urinating and hematuria. Musculoskeletal: Negative for arthralgias, gait problem and myalgias. Skin: Negative for color change and rash. Neurological: Positive for tremors (mild, improved compared to initial presentation). Negative for dizziness, seizures, weakness, light-headedness, numbness and headaches. Psychiatric/Behavioral: Negative for dysphoric mood, hallucinations and suicidal ideas. The patient is nervous/anxious (mildly increased compared to baseline).         Historical Information   Past Medical History:   Diagnosis Date   • Anxiety    • COVID-19     5/2022   • History of alcohol abuse     pt stopped on 9/10/22   • Hypertension    • Umbilical hernia      Past Surgical History:   Procedure Laterality Date   • NO PAST SURGERIES     • OH LAPS REPAIR HERNIA EXCEPT INCAL/INGUN REDUCIBLE N/A 12/20/2022    Procedure: ROBOTIC ASSISTED LAPAROSCOPIC UMBILICAL HERNIA REPAIR WITH MESH;  Surgeon: Katherine Gomez DO;  Location: [facility] OR; Service: General     No family history on file. Social History   Marital Status: Single   Patient Pre-hospital Living Situation: stable, lives with parents  Patient Pre-hospital Level of Mobility: independent  Patient Pre-hospital Diet Restrictions: none    Allergies   Allergen Reactions   • Shellfish-Derived Products - Food Allergy Anaphylaxis       Prior to Admission medications    Medication Sig Start Date End Date Taking?  Authorizing Provider   hydrOXYzine HCL (ATARAX) 25 mg tablet Take 25 mg by mouth daily at bedtime   Yes Historical Provider, MD   ibuprofen (MOTRIN) 400 mg tablet Take by mouth every 6 (six) hours as needed for mild pain   Yes Historical Provider, MD   metoprolol tartrate (LOPRESSOR) 50 mg tablet Take 50 mg by mouth every 12 (twelve) hours   Yes Historical Provider, MD   lisinopril (ZESTRIL) 10 mg tablet Take 10 mg by mouth daily    Historical Provider, MD   naloxone (NARCAN) 0.04 mg/mL Inject 0.04 mg into a catheter in a vein Q1MIN PRN  Patient not taking: Reported on 10/10/2023    Historical Provider, MD       Current Facility-Administered Medications   Medication Dose Route Frequency   • acetaminophen (TYLENOL) tablet 650 mg  650 mg Oral Q6H PRN   • aluminum-magnesium hydroxide-simethicone (MAALOX) oral suspension 30 mL  30 mL Oral Q6H PRN   • enoxaparin (LOVENOX) subcutaneous injection 40 mg  40 mg Subcutaneous Daily   • folic acid 1 mg, thiamine (VITAMIN B1) 100 mg in sodium chloride 0.9 % 100 mL IV piggyback   Intravenous Daily   • ketorolac (TORADOL) injection 15 mg  15 mg Intravenous Q6H PRN   • metoprolol tartrate (LOPRESSOR) tablet 50 mg  50 mg Oral Q12H [address]   • morphine injection 6 mg  6 mg Intravenous Q4H PRN   • multivitamin-minerals (CENTRUM) tablet 1 tablet  1 tablet Oral Daily   • nicotine (NICODERM CQ) 21 mg/24 hr TD 24 hr patch 1 patch  1 patch Transdermal Daily   • ondansetron (ZOFRAN) injection 4 mg  4 mg Intravenous Q6H PRN   • sodium chloride 0.9 % infusion  100 mL/hr Intravenous Continuous   • traZODone (DESYREL) tablet 50 mg  50 mg Oral HS PRN       Continuous Infusions:sodium chloride, 100 mL/hr, Last Rate: 100 mL/hr (10/10/23 4555)             Objective     No intake or output data in the 24 hours ending 10/11/23 0541    Invasive Devices:   Peripheral IV 10/10/23 Left Antecubital (Active)   Site Assessment WDL; Clean;Dry; Intact 10/10/23 1642   Dressing Type Transparent 10/10/23 1642   Line Status Blood return noted; Flushed 10/10/23 1642   Dressing Status Clean;Dry; Intact 10/10/23 1642       Peripheral IV 10/11/23 Proximal;Right;Ventral (anterior) Forearm (Active)       Vitals   Vitals:    10/10/23 2300 10/11/23 0155 10/11/23 0507   BP: (!) 166/116 (!) 165/115 (!) 167/116   TempSrc: Temporal Temporal Temporal   Pulse: (!) 112 (!) 110 (!) 111   Resp: 18 18 18   Patient Position - Orthostatic VS: Sitting Lying Lying   Temp: (!) 97 °F (36.1 °C) 97.7 °F (36.5 °C) 97.6 °F (36.4 °C)       Physical Exam  Vitals and nursing note reviewed. Constitutional:       General: He is not in acute distress. Appearance: He is obese. He is diaphoretic. HENT:      Head: Normocephalic and atraumatic. Right Ear: External ear normal.      Left Ear: External ear normal.      Nose: Nose normal.      Mouth/Throat:      Mouth: Mucous membranes are dry. Comments: No overt tongue fasciculations present  Eyes:      Extraocular Movements: Extraocular movements intact. Right eye: No nystagmus. Left eye: No nystagmus. Conjunctiva/sclera: Conjunctivae normal.      Comments: Pupils unequal, R (3 mm) > L (2 mm), which pt reports is chronic after a past head injury   Cardiovascular:      Rate and Rhythm: Regular rhythm. Tachycardia present. Pulses:           Dorsalis pedis pulses are 2+ on the right side and 2+ on the left side. Posterior tibial pulses are 2+ on the right side and 2+ on the left side. Heart sounds: Normal heart sounds. No murmur heard.      No friction rub. No gallop. Pulmonary:      Effort: Pulmonary effort is normal. No respiratory distress. Breath sounds: Decreased breath sounds (bilaterally, possibly 2/2 poor effort/intermittent dry cough) and wheezing (scattered expiratory wheezes) present. No rhonchi or rales. Abdominal:      General: Bowel sounds are normal. There is no distension. Palpations: Abdomen is soft. Tenderness: There is abdominal tenderness in the right upper quadrant, right lower quadrant, epigastric area and left upper quadrant. There is no guarding or rebound. Musculoskeletal:         General: No swelling. Cervical back: Neck supple. Right lower leg: No edema. Left lower leg: No edema. Skin:     General: Skin is warm. Findings: No rash. Neurological:      General: No focal deficit present. Mental Status: He is alert and oriented to person, place, and time. GCS: GCS eye subscore is 4. GCS verbal subscore is 5. GCS motor subscore is 6. Cranial Nerves: No dysarthria or facial asymmetry. Motor: Tremor (fine BUE intention tremor) present. Coordination: Finger-Nose-Finger Test normal.      Gait: Gait is intact. Gait normal.   Psychiatric:         Attention and Perception: Attention normal. He does not perceive auditory or visual hallucinations. Mood and Affect: Mood is anxious. Speech: Speech normal.         Behavior: Behavior is cooperative. Thought Content: Thought content does not include homicidal or suicidal ideation. Thought content does not include homicidal or suicidal plan. Data:    EKG, Pathology, and Other Studies: I have personally reviewed pertinent reports. EKG: "Normal sinus rhythm. Normal ECG. When compared with ECG of 13-DEC-2022 07:55,  Nonspecific T wave abnormality no longer evident in Lateral leads  Confirmed by Yandy Jackman ([id number]) on 10/10/2023 5:43:39 PM" QTc 419 ms.     Lab Results:  CBC ETOH     Lab Results   Component Value Date    WBC 12.53 (H) 10/11/2023    RBC 5.36 10/11/2023    HGB 17.0 10/11/2023    HCT 48.4 10/11/2023    MCV 90 10/11/2023    MCH 31.7 10/11/2023    MCHC 35.1 10/11/2023    RDW 14.0 10/11/2023     10/11/2023    MPV 10.4 10/11/2023      Lab Results   Component Value Date    LACTICACID 1.3 10/10/2023      CMP UA         Component Value Date/Time    K 4.2 10/10/2023 1642     10/10/2023 1642    CO2 23 10/10/2023 1642    BUN 10 10/10/2023 1642    CREATININE 0.73 10/10/2023 1642         Component Value Date/Time    CALCIUM 8.8 10/10/2023 1642    ALKPHOS 45 10/10/2023 1642    AST 65 (H) 10/10/2023 1642     (H) 10/10/2023 1642      Lab Results   Component Value Date    CLARITYU Clear 10/10/2023    COLORU Yellow 10/10/2023    SPECGRAV >=1.030 10/10/2023    PHUR 5.5 10/10/2023    GLUCOSEU Negative 10/10/2023    KETONESU Negative 10/10/2023    BLOODU Negative 10/10/2023    PROTEIN UA 30 (1+) (A) 10/10/2023    NITRITE Negative 10/10/2023    BILIRUBINUR Small (A) 10/10/2023    UROBILINOGEN 0.2 10/10/2023    LEUKOCYTESUR Negative 10/10/2023    WBCUA 0-1 10/10/2023    RBCUA None Seen 10/10/2023    BACTERIA None Seen 10/10/2023    EPIS None Seen 10/10/2023        Liver Function Test: ASA     Lab Results   Component Value Date    TBILI 0.57 10/10/2023    ALKPHOS 45 10/10/2023    AST 65 (H) 10/10/2023     (H) 10/10/2023    TP 7.0 10/10/2023    ALB 4.1 10/10/2023      No results found for: "SALICYLATE"   Troponin APAP     No results found for: "TROPONINI"   No results found for: "ACTMNPHEN"   VBG HCG     No results found for: "PHVEN", "DSZ9ZIL", "PO2VEN", "RXX3VXJ", "Veldon Leticia", "B6TMMVZQU", "V0BRNBX"   No results found for: "HCGQUANT"   ABG Urine Drug Screen     No results found for: "PHART", "NYH2SBM", "PO2ART", "VPI1UZX", "BEART", "Q6ENKTYVO", "O2HGB", "SOURC", "CHERELLE", "VTAC", "Juanda Demond", "Levora Maynor", "PEEP"   Lab Results   Component Value Date    AMPMETHUR Negative 10/10/2023 BARBTUR Negative 10/10/2023    BDZUR Negative 10/10/2023    COCAINEUR Negative 10/10/2023    METHADONEUR Negative 10/10/2023    OPIATEUR Negative 10/10/2023    PCPUR Negative 10/10/2023    THCUR Negative 10/10/2023    OXYCODONEUR Negative 10/10/2023      Lactate INR     Lab Results   Component Value Date    LACTICACID 1.3 10/10/2023      Lab Results   Component Value Date    INR 0.92 10/10/2023      PTT Protime     Lab Results   Component Value Date/Time    PTT 24 10/10/2023 04:42 PM        Lab Results   Component Value Date/Time    PROTIME 12.7 10/10/2023 04:42 PM              Imaging Studies: I have personally reviewed pertinent reports. Counseling / Coordination of Care  Total floor / unit time spent today 53 minutes. Greater than 50% of total time was spent with the patient and / or family counseling and / or coordination of care. Minutes of critical care time 45  -Critical care time was exclusive of separately billable procedures and teaching time.   -Critical care was necessary to treat or prevent imminent or life-threatening deterioration of the following condition: CNS failure/compromise, toxidrome (ethanol withdrawal),  toxidrome, withdrawal, hepatic failure, dehydration and acute pancreatitis  -Critical care time was spent personally by me on the following activities as well as the above as per the course and rest of chart: obtaining history from patient/surrogate, development of a treatment plan, discussions with referring provider(s), evaluation of patient's response to the treatment, examination of the patient, performing treatments and interventions, re-evaluation of the patient's condition, review of old charts, ordering/interpreting laboratory studies, ordering/interpreting of radiographic studies. ** Please Note: This note has been constructed using a voice recognition system.  **

## 2023-10-11 NOTE — ED NOTES
PT. Able to stand and pivot onto EMS stretcher to own ability. Report given to EMS providers and pt.  Care transferred to EMS      Gogo Plunkett RN  10/10/23 [id number]

## 2023-10-11 NOTE — NURSING NOTE
Patient asleep at this time. Heart rate, respiratory rate and SPO2 are within normal limits. SEWS score deferred at this time.

## 2023-10-12 ENCOUNTER — HOSPITAL ENCOUNTER (INPATIENT)
Facility: HOSPITAL | Age: 38
LOS: 5 days | Discharge: HOME | End: 2023-10-17
Attending: INTERNAL MEDICINE | Admitting: INTERNAL MEDICINE
Payer: COMMERCIAL

## 2023-10-12 ENCOUNTER — APPOINTMENT (INPATIENT)
Dept: RADIOLOGY | Facility: HOSPITAL | Age: 38
End: 2023-10-12
Payer: COMMERCIAL

## 2023-10-12 ENCOUNTER — APPOINTMENT (INPATIENT)
Dept: CT IMAGING | Facility: HOSPITAL | Age: 38
End: 2023-10-12
Payer: COMMERCIAL

## 2023-10-12 ENCOUNTER — APPOINTMENT (INPATIENT)
Dept: NON INVASIVE DIAGNOSTICS | Facility: HOSPITAL | Age: 38
End: 2023-10-12
Payer: COMMERCIAL

## 2023-10-12 VITALS
OXYGEN SATURATION: 88 % | WEIGHT: 260 LBS | BODY MASS INDEX: 35.21 KG/M2 | DIASTOLIC BLOOD PRESSURE: 72 MMHG | RESPIRATION RATE: 18 BRPM | HEART RATE: 115 BPM | TEMPERATURE: 100 F | HEIGHT: 72 IN | SYSTOLIC BLOOD PRESSURE: 131 MMHG

## 2023-10-12 DIAGNOSIS — K85.90 ACUTE PANCREATITIS, UNSPECIFIED COMPLICATION STATUS, UNSPECIFIED PANCREATITIS TYPE: Primary | ICD-10-CM

## 2023-10-12 DIAGNOSIS — F10.20 ALCOHOL USE DISORDER, SEVERE, DEPENDENCE (HCC): ICD-10-CM

## 2023-10-12 PROBLEM — R00.0 TACHYCARDIA: Status: ACTIVE | Noted: 2023-10-12

## 2023-10-12 PROBLEM — D69.6 THROMBOCYTOPENIA (HCC): Status: ACTIVE | Noted: 2023-10-12

## 2023-10-12 LAB
ALBUMIN SERPL BCP-MCNC: 3.3 G/DL (ref 3.5–5)
ALP SERPL-CCNC: 35 U/L (ref 34–104)
ALT SERPL W P-5'-P-CCNC: 59 U/L (ref 7–52)
ANION GAP SERPL CALCULATED.3IONS-SCNC: 6 MMOL/L
AORTIC ROOT: 3.1 CM
APICAL FOUR CHAMBER EJECTION FRACTION: 55 %
ASCENDING AORTA: 3.6 CM
AST SERPL W P-5'-P-CCNC: 23 U/L (ref 13–39)
BACTERIA UR QL AUTO: ABNORMAL /HPF
BILIRUB SERPL-MCNC: 1.33 MG/DL (ref 0.2–1)
BILIRUB UR QL STRIP: NEGATIVE
BUN SERPL-MCNC: 8 MG/DL (ref 5–25)
CALCIUM ALBUM COR SERPL-MCNC: 8.7 MG/DL (ref 8.3–10.1)
CALCIUM SERPL-MCNC: 8.1 MG/DL (ref 8.4–10.2)
CHLORIDE SERPL-SCNC: 101 MMOL/L (ref 96–108)
CLARITY UR: CLEAR
CO2 SERPL-SCNC: 27 MMOL/L (ref 21–32)
COLOR UR: ABNORMAL
CREAT SERPL-MCNC: 0.72 MG/DL (ref 0.6–1.3)
E WAVE DECELERATION TIME: 124 MS
E/A RATIO: 1.29
ERYTHROCYTE [DISTWIDTH] IN BLOOD BY AUTOMATED COUNT: 14.6 % (ref 11.6–15.1)
FLUAV RNA RESP QL NAA+PROBE: NEGATIVE
FLUBV RNA RESP QL NAA+PROBE: NEGATIVE
FRACTIONAL SHORTENING: 33 (ref 28–44)
GFR SERPL CREATININE-BSD FRML MDRD: 117 ML/MIN/1.73SQ M
GLUCOSE SERPL-MCNC: 116 MG/DL (ref 65–140)
GLUCOSE UR STRIP-MCNC: NEGATIVE MG/DL
HCT VFR BLD AUTO: 45.2 % (ref 36.5–49.3)
HGB BLD-MCNC: 15.4 G/DL (ref 12–17)
HGB UR QL STRIP.AUTO: NEGATIVE
INTERVENTRICULAR SEPTUM IN DIASTOLE (PARASTERNAL SHORT AXIS VIEW): 1 CM
INTERVENTRICULAR SEPTUM: 1 CM (ref 0.6–1.1)
KETONES UR STRIP-MCNC: NEGATIVE MG/DL
LAAS-AP2: 19.4 CM2
LAAS-AP4: 21.1 CM2
LACTATE SERPL-SCNC: 0.7 MMOL/L (ref 0.5–2)
LEFT ATRIUM SIZE: 3.5 CM
LEFT ATRIUM VOLUME (MOD BIPLANE): 61 ML
LEFT ATRIUM VOLUME INDEX (MOD BIPLANE): 25.6 ML/M2
LEFT INTERNAL DIMENSION IN SYSTOLE: 3 CM (ref 2.1–4)
LEFT VENTRICLE DIASTOLIC VOLUME (MOD BIPLANE): 73 ML
LEFT VENTRICLE SYSTOLIC VOLUME (MOD BIPLANE): 33 ML
LEFT VENTRICULAR INTERNAL DIMENSION IN DIASTOLE: 4.5 CM (ref 3.5–6)
LEFT VENTRICULAR POSTERIOR WALL IN END DIASTOLE: 1.1 CM
LEFT VENTRICULAR STROKE VOLUME: 59 ML
LEUKOCYTE ESTERASE UR QL STRIP: 25
LIPASE SERPL-CCNC: 990 U/L (ref 11–82)
LV EF: 54 %
LVSV (TEICH): 59 ML
MCH RBC QN AUTO: 31 PG (ref 26.8–34.3)
MCHC RBC AUTO-ENTMCNC: 34.1 G/DL (ref 31.4–37.4)
MCV RBC AUTO: 91 FL (ref 82–98)
MUCOUS THREADS UR QL AUTO: ABNORMAL
MV E'TISSUE VEL-SEP: 11 CM/S
MV PEAK A VEL: 0.51 M/S
MV PEAK E VEL: 66 CM/S
MV STENOSIS PRESSURE HALF TIME: 36 MS
MV VALVE AREA P 1/2 METHOD: 6.11
NITRITE UR QL STRIP: NEGATIVE
NON-SQ EPI CELLS URNS QL MICRO: ABNORMAL /HPF
PH UR STRIP.AUTO: 6 [PH]
PLATELET # BLD AUTO: 145 THOUSANDS/UL (ref 149–390)
PMV BLD AUTO: 10.2 FL (ref 8.9–12.7)
POTASSIUM SERPL-SCNC: 3.6 MMOL/L (ref 3.5–5.3)
PROCALCITONIN SERPL-MCNC: 0.47 NG/ML
PROT SERPL-MCNC: 6 G/DL (ref 6.4–8.4)
PROT UR STRIP-MCNC: ABNORMAL MG/DL
RBC # BLD AUTO: 4.96 MILLION/UL (ref 3.88–5.62)
RBC #/AREA URNS AUTO: ABNORMAL /HPF
RIGHT ATRIUM AREA SYSTOLE A4C: 11.4 CM2
RIGHT VENTRICLE ID DIMENSION: 3.8 CM
RSV RNA RESP QL NAA+PROBE: NEGATIVE
SARS-COV-2 RNA RESP QL NAA+PROBE: NEGATIVE
SL CV LEFT ATRIUM LENGTH A2C: 5.4 CM
SL CV LV EF: 55
SL CV PED ECHO LEFT VENTRICLE DIASTOLIC VOLUME (MOD BIPLANE) 2D: 95 ML
SL CV PED ECHO LEFT VENTRICLE SYSTOLIC VOLUME (MOD BIPLANE) 2D: 36 ML
SODIUM SERPL-SCNC: 134 MMOL/L (ref 135–147)
SP GR UR STRIP.AUTO: 1.01 (ref 1–1.04)
TRICUSPID ANNULAR PLANE SYSTOLIC EXCURSION: 1.8 CM
UROBILINOGEN UA: NEGATIVE MG/DL
WBC # BLD AUTO: 13.33 THOUSAND/UL (ref 4.31–10.16)
WBC #/AREA URNS AUTO: ABNORMAL /HPF

## 2023-10-12 PROCEDURE — HZ2ZZZZ DETOXIFICATION SERVICES FOR SUBSTANCE ABUSE TREATMENT: ICD-10-PCS | Performed by: INTERNAL MEDICINE

## 2023-10-12 PROCEDURE — 81003 URINALYSIS AUTO W/O SCOPE: CPT | Performed by: EMERGENCY MEDICINE

## 2023-10-12 PROCEDURE — 71045 X-RAY EXAM CHEST 1 VIEW: CPT

## 2023-10-12 PROCEDURE — 99223 1ST HOSP IP/OBS HIGH 75: CPT | Performed by: INTERNAL MEDICINE

## 2023-10-12 PROCEDURE — 74177 CT ABD & PELVIS W/CONTRAST: CPT

## 2023-10-12 PROCEDURE — 71275 CT ANGIOGRAPHY CHEST: CPT

## 2023-10-12 PROCEDURE — G1004 CDSM NDSC: HCPCS

## 2023-10-12 PROCEDURE — 93306 TTE W/DOPPLER COMPLETE: CPT

## 2023-10-12 PROCEDURE — 87040 BLOOD CULTURE FOR BACTERIA: CPT | Performed by: EMERGENCY MEDICINE

## 2023-10-12 PROCEDURE — 99239 HOSP IP/OBS DSCHRG MGMT >30: CPT | Performed by: EMERGENCY MEDICINE

## 2023-10-12 PROCEDURE — NC001 PR NO CHARGE: Performed by: SURGERY

## 2023-10-12 PROCEDURE — 80053 COMPREHEN METABOLIC PANEL: CPT

## 2023-10-12 PROCEDURE — 83605 ASSAY OF LACTIC ACID: CPT | Performed by: EMERGENCY MEDICINE

## 2023-10-12 PROCEDURE — NC001 PR NO CHARGE: Performed by: EMERGENCY MEDICINE

## 2023-10-12 PROCEDURE — 83690 ASSAY OF LIPASE: CPT

## 2023-10-12 PROCEDURE — C9113 INJ PANTOPRAZOLE SODIUM, VIA: HCPCS | Performed by: NURSE PRACTITIONER

## 2023-10-12 PROCEDURE — 81001 URINALYSIS AUTO W/SCOPE: CPT | Performed by: EMERGENCY MEDICINE

## 2023-10-12 PROCEDURE — 85027 COMPLETE CBC AUTOMATED: CPT

## 2023-10-12 PROCEDURE — 0241U HB NFCT DS VIR RESP RNA 4 TRGT: CPT | Performed by: EMERGENCY MEDICINE

## 2023-10-12 PROCEDURE — 84145 PROCALCITONIN (PCT): CPT | Performed by: EMERGENCY MEDICINE

## 2023-10-12 PROCEDURE — 93306 TTE W/DOPPLER COMPLETE: CPT | Performed by: INTERNAL MEDICINE

## 2023-10-12 RX ORDER — ENOXAPARIN SODIUM 100 MG/ML
40 INJECTION SUBCUTANEOUS DAILY
Status: CANCELLED | OUTPATIENT
Start: 2023-10-13

## 2023-10-12 RX ORDER — PHENOBARBITAL SODIUM 65 MG/ML
65 INJECTION INTRAMUSCULAR ONCE
Status: COMPLETED | OUTPATIENT
Start: 2023-10-12 | End: 2023-10-12

## 2023-10-12 RX ORDER — LISINOPRIL 10 MG/1
10 TABLET ORAL DAILY
Status: CANCELLED | OUTPATIENT
Start: 2023-10-13

## 2023-10-12 RX ORDER — ONDANSETRON 2 MG/ML
4 INJECTION INTRAMUSCULAR; INTRAVENOUS EVERY 8 HOURS PRN
Status: DISCONTINUED | OUTPATIENT
Start: 2023-10-12 | End: 2023-10-17 | Stop reason: HOSPADM

## 2023-10-12 RX ORDER — ONDANSETRON 2 MG/ML
4 INJECTION INTRAMUSCULAR; INTRAVENOUS EVERY 6 HOURS PRN
Status: CANCELLED | OUTPATIENT
Start: 2023-10-12

## 2023-10-12 RX ORDER — PANTOPRAZOLE SODIUM 40 MG/10ML
40 INJECTION, POWDER, LYOPHILIZED, FOR SOLUTION INTRAVENOUS
Status: DISCONTINUED | OUTPATIENT
Start: 2023-10-12 | End: 2023-10-14

## 2023-10-12 RX ORDER — METOPROLOL TARTRATE 50 MG/1
50 TABLET, FILM COATED ORAL EVERY 12 HOURS SCHEDULED
Status: CANCELLED | OUTPATIENT
Start: 2023-10-12

## 2023-10-12 RX ORDER — SODIUM CHLORIDE, SODIUM LACTATE, POTASSIUM CHLORIDE, CALCIUM CHLORIDE 600; 310; 30; 20 MG/100ML; MG/100ML; MG/100ML; MG/100ML
125 INJECTION, SOLUTION INTRAVENOUS CONTINUOUS
Status: DISCONTINUED | OUTPATIENT
Start: 2023-10-12 | End: 2023-10-14

## 2023-10-12 RX ORDER — HYDROXYZINE HYDROCHLORIDE 25 MG/1
25 TABLET, FILM COATED ORAL
Status: DISCONTINUED | OUTPATIENT
Start: 2023-10-12 | End: 2023-10-12

## 2023-10-12 RX ORDER — ACETAMINOPHEN 325 MG/1
650 TABLET ORAL EVERY 6 HOURS PRN
Status: CANCELLED | OUTPATIENT
Start: 2023-10-12

## 2023-10-12 RX ORDER — ENOXAPARIN SODIUM 100 MG/ML
40 INJECTION SUBCUTANEOUS DAILY
Status: DISCONTINUED | OUTPATIENT
Start: 2023-10-13 | End: 2023-10-17 | Stop reason: HOSPADM

## 2023-10-12 RX ORDER — NICOTINE 21 MG/24HR
21 PATCH, TRANSDERMAL 24 HOURS TRANSDERMAL DAILY
Status: DISCONTINUED | OUTPATIENT
Start: 2023-10-13 | End: 2023-10-17 | Stop reason: HOSPADM

## 2023-10-12 RX ORDER — CEFTRIAXONE 1 G/50ML
1000 INJECTION, SOLUTION INTRAVENOUS EVERY 24 HOURS
Status: DISCONTINUED | OUTPATIENT
Start: 2023-10-12 | End: 2023-10-12

## 2023-10-12 RX ORDER — MAGNESIUM HYDROXIDE/ALUMINUM HYDROXICE/SIMETHICONE 120; 1200; 1200 MG/30ML; MG/30ML; MG/30ML
30 SUSPENSION ORAL EVERY 6 HOURS PRN
Status: CANCELLED | OUTPATIENT
Start: 2023-10-12

## 2023-10-12 RX ORDER — DEXTROSE AND SODIUM CHLORIDE 5; .9 G/100ML; G/100ML
100 INJECTION, SOLUTION INTRAVENOUS CONTINUOUS
Status: CANCELLED | OUTPATIENT
Start: 2023-10-12

## 2023-10-12 RX ORDER — PHENOBARBITAL SODIUM 130 MG/ML
130 INJECTION INTRAMUSCULAR ONCE
Status: COMPLETED | OUTPATIENT
Start: 2023-10-12 | End: 2023-10-12

## 2023-10-12 RX ORDER — CHLORHEXIDINE GLUCONATE ORAL RINSE 1.2 MG/ML
15 SOLUTION DENTAL EVERY 12 HOURS SCHEDULED
Status: DISCONTINUED | OUTPATIENT
Start: 2023-10-12 | End: 2023-10-14

## 2023-10-12 RX ORDER — MORPHINE SULFATE 10 MG/ML
6 INJECTION, SOLUTION INTRAMUSCULAR; INTRAVENOUS EVERY 4 HOURS PRN
Status: CANCELLED | OUTPATIENT
Start: 2023-10-12

## 2023-10-12 RX ORDER — NICOTINE 21 MG/24HR
1 PATCH, TRANSDERMAL 24 HOURS TRANSDERMAL DAILY
Status: CANCELLED | OUTPATIENT
Start: 2023-10-13

## 2023-10-12 RX ORDER — LORAZEPAM 2 MG/ML
1 INJECTION INTRAMUSCULAR EVERY 8 HOURS
Status: DISCONTINUED | OUTPATIENT
Start: 2023-10-12 | End: 2023-10-13

## 2023-10-12 RX ORDER — LORAZEPAM 2 MG/ML
1 INJECTION INTRAMUSCULAR EVERY 4 HOURS PRN
Status: DISCONTINUED | OUTPATIENT
Start: 2023-10-12 | End: 2023-10-12

## 2023-10-12 RX ADMIN — NICOTINE 1 PATCH: 21 PATCH, EXTENDED RELEASE TRANSDERMAL at 09:10

## 2023-10-12 RX ADMIN — MULTIPLE VITAMINS W/ MINERALS TAB 1 TABLET: TAB ORAL at 09:09

## 2023-10-12 RX ADMIN — FOLIC ACID: 5 INJECTION, SOLUTION INTRAMUSCULAR; INTRAVENOUS; SUBCUTANEOUS at 09:07

## 2023-10-12 RX ADMIN — CHLORHEXIDINE GLUCONATE 15 ML: 1.2 RINSE ORAL at 20:26

## 2023-10-12 RX ADMIN — PHENOBARBITAL SODIUM 130 MG: 130 INJECTION INTRAMUSCULAR at 05:36

## 2023-10-12 RX ADMIN — MORPHINE SULFATE 6 MG: 10 INJECTION INTRAVENOUS at 03:15

## 2023-10-12 RX ADMIN — MORPHINE SULFATE 6 MG: 10 INJECTION INTRAVENOUS at 12:42

## 2023-10-12 RX ADMIN — LORAZEPAM 1 MG: 2 INJECTION INTRAMUSCULAR; INTRAVENOUS at 19:05

## 2023-10-12 RX ADMIN — SODIUM CHLORIDE, SODIUM LACTATE, POTASSIUM CHLORIDE, AND CALCIUM CHLORIDE 125 ML/HR: .6; .31; .03; .02 INJECTION, SOLUTION INTRAVENOUS at 14:42

## 2023-10-12 RX ADMIN — MORPHINE SULFATE 2 MG: 2 INJECTION, SOLUTION INTRAMUSCULAR; INTRAVENOUS at 16:51

## 2023-10-12 RX ADMIN — CHLORHEXIDINE GLUCONATE 15 ML: 1.2 RINSE ORAL at 14:42

## 2023-10-12 RX ADMIN — LISINOPRIL 10 MG: 10 TABLET ORAL at 09:09

## 2023-10-12 RX ADMIN — METOPROLOL TARTRATE 50 MG: 50 TABLET, FILM COATED ORAL at 09:09

## 2023-10-12 RX ADMIN — MORPHINE SULFATE 6 MG: 10 INJECTION INTRAVENOUS at 08:07

## 2023-10-12 RX ADMIN — IOHEXOL 100 ML: 350 INJECTION, SOLUTION INTRAVENOUS at 10:28

## 2023-10-12 RX ADMIN — DEXTROSE AND SODIUM CHLORIDE 100 ML/HR: 5; .9 INJECTION, SOLUTION INTRAVENOUS at 03:14

## 2023-10-12 RX ADMIN — SODIUM CHLORIDE, SODIUM LACTATE, POTASSIUM CHLORIDE, AND CALCIUM CHLORIDE 1000 ML: .6; .31; .03; .02 INJECTION, SOLUTION INTRAVENOUS at 21:05

## 2023-10-12 RX ADMIN — PIPERACILLIN AND TAZOBACTAM 3.38 G: 3; .375 INJECTION, POWDER, FOR SOLUTION INTRAVENOUS at 12:19

## 2023-10-12 RX ADMIN — PHENOBARBITAL SODIUM 65 MG: 65 INJECTION INTRAMUSCULAR; INTRAVENOUS at 03:15

## 2023-10-12 RX ADMIN — MORPHINE SULFATE 6 MG: 2 INJECTION, SOLUTION INTRAMUSCULAR; INTRAVENOUS at 18:32

## 2023-10-12 RX ADMIN — ENOXAPARIN SODIUM 40 MG: 40 INJECTION SUBCUTANEOUS at 09:10

## 2023-10-12 RX ADMIN — MORPHINE SULFATE 6 MG: 2 INJECTION, SOLUTION INTRAMUSCULAR; INTRAVENOUS at 21:42

## 2023-10-12 RX ADMIN — PANTOPRAZOLE SODIUM 40 MG: 40 INJECTION, POWDER, FOR SOLUTION INTRAVENOUS at 16:51

## 2023-10-12 NOTE — DISCHARGE SUMMARY
MEDICAL DETOX UNIT, LEVEL 4  Department of Medical Toxicology  Reason for Admission/Principal Problem: pancreatitis and alcohol withdrawal   Admitting provider: Ebony Stout DO  No att. providers found   10/10/2023 11:12 PM       Discharging Physician / Practitioner: Ebony Stout DO  PCP: No primary care provider on file. Admission Date:   Admission Orders (From admission, onward)       Ordered        10/10/23 2321  Inpatient Admission  Once                          Discharge Date: 10/12/23    Medical Problems       Resolved Problems  Date Reviewed: 10/11/2023            Resolved    Alcohol withdrawal syndrome with complication ([address]) 41/30/0494     Resolved by  Ebony Stout DO          Tachycardia  Assessment & Plan  Multifactorial secondary to ethanol withdrawal, pain, and sepsis. Given rales on exam and h/o AUD, will obtain echo to evaluate for cardiomyopathy. Thrombocytopenia ([address])  Assessment & Plan  Associated with ETOH WD, as anticipated. Continue to monitor. Sepsis with acute organ dysfunction without septic shock (HCC)  Assessment & Plan  Leukocytosis monitored in the setting of ETOH wd and ETOH pancreatitis, increasing. No initial evidence of active infection but fever present today, concern exists for possible PNA despite negative CXR, possible viral infection, worsening pancreatitis. Follow up additional diagnostics: CT chest, abdomen, pelvis, blood cultures, lactate, procal, viral studies. Empiric antibiotics started: ceftriaxone, will adjust further per additional findings.   Continue monitoring CBC, VS, symptoms    Elevated LFTs  Assessment & Plan  Lab Results   Component Value Date    ALT 59 (H) 10/12/2023    AST 23 10/12/2023    ALKPHOS 35 10/12/2023    TBILI 1.33 (H) 10/12/2023        AST and ALT improving, bilirubin with slight uptrend, continue ot monitor CMP  Likely 2/2 chronic alcohol use/alcoholic liver disease  CT A/P 10/10 showed hepatic steatosis without mass, abnormal contours, or biliary duct dilation  Encourage alcohol cessation    Alcohol use disorder, severe, dependence ([address])  Assessment & Plan  Pt with a h/o chronic heavy alcohol use  Drinks 8 beers (16 oz) + 8 shots of whiskey daily  Denies H/o withdrawal seizures  Hx of prior Vivitrol MAT for AUD, with adverse reaction of feeling unwell for most of the month when he was on this medication   Declines naltrexone at this time   Expresses potential interested in acamprosate as alternative MAT  Withdrawal management as above  Continue IVFs, thiamine/folic acid supplementation, and MVI  Consult case management for assistance with aftercare resources - pt contemplating inpatient v. Outpatient rehab at this time    Anxiety  Assessment & Plan  Pt with a hx of anxiety  Home medication per chart review of Atarax 50 mg Q6H PRN anxiety   Reports he takes it at least once per day QHS for sleep and intermittently during the day as needed  Denies SI/HI/thoughts of self harm  Will hold Atarax while pt is undergoing acute alcohol withdrawal treatment with phenobarbital   May resume once w/d has stabilized/improved    Current smoker  Assessment & Plan  Daily tobacco user   Offer NRT  Encourage cessation    Hypertension  Assessment & Plan  Patient with a history of HTN  Continue current home medication regimen: metoprolol 50 mg BID and lisinopril 10 mg daily  Most Recent Blood Pressure: 128/78    * Alcohol-induced acute pancreatitis with infected necrosis  Assessment & Plan  Abdominal pain/tenderness persists. Lipase continues to downtrend  Initial CT A/P showed "Peripancreatic inflammatory changes consistent with acute pancreatitis" without evidence of acute infection, necrosis, or pseudocyst formation. Continue IVF, clear liquid diet, advance as tolerated  Pain control- PRN Tylenol, morphine IV providing relieve.  DC toradol d/t additional risk of gastritis in setting of AUD  New evidence of pancreatic necrosis on CT      Alcohol withdrawal syndrome with complication (HCC)-resolved as of 10/12/2023  Assessment & Plan  Last drink 10/9 around 0000  Serum alcohol <10 in the ED  Continue [facility] protocol for medical management of alcohol withdrawal  Most recent score 0  Likely to continue improving from that regard and be off protocol in less than 24 hours. Continuous pulse ox and telemetry monitoring        Consultations During Hospital Stay:  NA    Procedures Performed:   CT imaging    Significant Findings / Test Results:   Progression of pancreatitis to necrotizing pancreatitis  Pleural effusions     Incidental Findings:   na     Test Results Pending at Discharge (will require follow up):   echocardiogram     Outpatient Tests Requested:  na    Complications:  developed necrotizing pancreatitis     Reason for Admission: pancreatitis     Hospital Course:     Sugar Daley is a 43 y.o. male patient who originally presented to the hospital on 10/10/2023 due to alcohol associated pancreatitis and alcohol withdrawal. He was placed on NPO and IVF. Withdrawal was fully managed. Abdominal pain persisted and tachycardia continued beyond withdrawal phase. Fever then developed, prompting septic evaluation, repeat imaging. He was started on Zosyn. CT imaging showed new necrosis of pancreas and pleural effusions. Patient accepted in [facility] ICU by Dr. Jeniffer Rivas for transfer. The patient, initially admitted to the hospital as inpatient, was discharged earlier than expected given the following: transfer to higher level of care. Please see above list of diagnoses and related plan for additional information.      Condition at Discharge: stable     Discharge Day Visit / Exam:     Vitals: Blood Pressure: 131/72 (10/12/23 1128)  Pulse: (!) 115 (10/12/23 1128)  Temperature: 100 °F (37.8 °C) (10/12/23 1128)  Temp Source: Oral (10/12/23 0909)  Respirations: 18 (10/12/23 1128)  Height: 6' (182.9 cm) (10/12/23 1112)  Weight - Scale: 118 kg (260 lb) (10/12/23 1112)  SpO2: (!) 88 % (10/12/23 1221)  Exam:   Physical Exam  See progress note from same day. Disposition:     [address] Transfer to [facility]   Planned Readmission: discharge/readmit      Discharge Statement:  I spent 45 minutes discharging the patient. This time was spent on the day of discharge. I had direct contact with the patient on the day of discharge. Greater than 50% of the total time was spent examining patient, answering all patient questions, arranging and discussing plan of care with patient as well as directly providing post-discharge instructions. Additional time then spent on discharge activities. Discharge Medications:  See after visit summary for reconciled discharge medications provided to patient and family.       ** Please Note: This note has been constructed using a voice recognition system **

## 2023-10-12 NOTE — H&P
[facility]  H&P  Name: Allison Lira 43 y.o. male I MRN: [id number]  Unit/Bed#: ICU 03 I Date of Admission: 10/12/2023   Date of Service: 10/12/2023 I Hospital Day: 0      Assessment/Plan   Alcohol-induced acute pancreatitis with infected necrosis  Assessment & Plan   Alcohol-induced acute pancreatitis with infected necrosis  Assessment & Plan    Patient with past medical history of ETOH abuse presented to [facility] ED with abdominal pain radiating to back for 2 days. Initial CT A/P showed "Peripancreatic inflammatory changes consistent with acute pancreatitis" without evidence of acute infection, necrosis, or pseudocyst formation. Patient transferred to [facility] for detox. 10/12 repeat CT showed new evidence of pancreatic necrosis, 3.6 x 3.1 cm, with worsening of extensive retroperitoneal fat stranding and edema. NPO  Pain control- morphine IV PRN  Monitor for worsening signs or symptoms of necrosis    Daily CBC  Trend lactate   Consult general surgery to evaluate          Alcohol use disorder, severe, dependence ([address])  Assessment & Plan  Pt with a h/o chronic heavy alcohol use, Drinks 8 beers (16 oz) + 8 shots of whiskey daily. Last drink 10/9     Plan  CIWA with Ativan PRN   Continue IVFs,   thiamine/folic acid supplementation  PPI prophylaxis   Continue to monitor CMP          Elevated LFTs  Assessment & Plan  AST and ALT elevated on admission, improving, bilirubin with slight uptrend. Likely 2/2 chronic alcohol use/alcoholic liver disease. CT A/P 10/10 showed hepatic steatosis without mass, abnormal contours, or biliary duct dilation     Monitor CMP  Encourage alcohol cessation    Anxiety  Assessment & Plan  Historial.  Takes Hydroxyzine 25 mg at home.       Continue at home hydroxyzine     Sepsis with acute organ dysfunction without septic shock (HCC)-resolved as of 10/12/2023  Assessment & Plan               History of Present Illness     HPI: Allison Lira is a 43 y.o. who presents to [facility] with abdominal pain for 2 days with heavy ETOH use. CT scan showed pancreatitis. Patient transferred to [address] [place] [address] for detox. Repeat CT scan showed small amount of necrosis. Transferred to [facility] for further monitoring. History obtained from the patient. Review of Systems   Constitutional:  Negative for chills, fatigue and fever. Respiratory:  Negative for cough and shortness of breath. Cardiovascular:  Negative for chest pain. Gastrointestinal:  Positive for abdominal distention and abdominal pain. Negative for blood in stool, constipation, diarrhea and nausea. Genitourinary:  Negative for difficulty urinating, dysuria and frequency. Musculoskeletal:  Positive for back pain. Skin:  Negative for rash. Neurological:  Negative for dizziness, light-headedness and headaches. Psychiatric/Behavioral:  Negative for agitation and hallucinations. The patient is not nervous/anxious. Historical Information   Past Medical History:  No date:  Anxiety  No date: COVID-19      Comment:  5/2022  No date: History of alcohol abuse      Comment:  pt stopped on 9/10/22  No date: Hypertension  No date: Umbilical hernia Past Surgical History:  No date: NO PAST SURGERIES  12/20/2022: IN LAPS REPAIR HERNIA EXCEPT INCAL/INGUN REDUCIBLE; N/A      Comment:  Procedure: ROBOTIC ASSISTED LAPAROSCOPIC UMBILICAL                HERNIA REPAIR WITH MESH;  Surgeon: Flores Bustillos DO;  Location:  MAIN OR;  Service: General   Current Outpatient Medications   Medication Instructions    hydrOXYzine HCL (ATARAX) 25 mg, Oral, Daily at bedtime    ibuprofen (MOTRIN) 400 mg tablet Oral, Every 6 hours PRN    lisinopril (ZESTRIL) 10 mg, Oral, Daily    metoprolol tartrate (LOPRESSOR) 50 mg, Oral, Every 12 hours scheduled    naloxone (NARCAN) 0.04 mg, Every 1 Minute as needed    Allergies   Allergen Reactions    Shellfish-Derived Products - Food Allergy Anaphylaxis      Social History     Tobacco Use Smoking status: Every Day     Packs/day: 1.00     Years: 20.00     Total pack years: 20.00     Types: Cigarettes    Smokeless tobacco: Never   Vaping Use    Vaping Use: Never used   Substance Use Topics    Alcohol use: Yes     Comment: pt. voiced he has been consuming "a lot" of alcohol lately    Drug use: Never    No family history on file. Objective                            Vitals I/O      Most Recent Min/Max in 24hrs   Temp 99.5 °F (37.5 °C) Temp  Min: 98.9 °F (37.2 °C)  Max: 100.8 °F (38.2 °C)   Pulse (!) 116 Pulse  Min: 108  Max: 133   Resp (!) 23 Resp  Min: 18  Max: 25   /75 BP  Min: 113/76  Max: 168/107   O2 Sat 95 % SpO2  Min: 88 %  Max: 96 %      Intake/Output Summary (Last 24 hours) at 10/12/2023 1630  Last data filed at 10/12/2023 1601  Gross per 24 hour   Intake 164.58 ml   Output --   Net 164.58 ml         Diet NPO     Invasive Monitoring Physical exam    Physical Exam  Skin:     General: Skin is warm and dry. HENT:      Mouth/Throat:      Mouth: Mucous membranes are dry. Cardiovascular:      Rate and Rhythm: Regular rhythm. Tachycardia present. Pulses: Normal pulses. Musculoskeletal:         General: Normal range of motion. Abdominal:      General: There is distension. Tenderness: There is abdominal tenderness. There is guarding. Constitutional:       Appearance: He is well-developed and well-nourished. Pulmonary:      Effort: Pulmonary effort is normal.      Breath sounds: Normal breath sounds. Neurological:      General: No focal deficit present. Mental Status: He is alert and oriented to person, place and time. He is not agitated. Diagnostic Studies      EKG:NSR   Imaging: CT scan I have personally reviewed pertinent reports.        Medications:  Scheduled PRN   chlorhexidine, 15 mL, Q12H [address]  [START ON 10/13/2023] enoxaparin, 40 mg, Daily          Continuous    lactated ringers, 125 mL/hr, Last Rate: 125 mL/hr (10/12/23 1442) Labs:    CBC    Recent Labs     10/11/23  0447 10/12/23  0327   WBC 12.53* 13.33*   HGB 17.0 15.4   HCT 48.4 45.2    145*     BMP    Recent Labs     10/11/23  0447 10/12/23  0327   SODIUM 135 134*   K 4.2 3.6    101   CO2 26 27   AGAP 7 6   BUN 8 8   CREATININE 0.63 0.72   CALCIUM 8.5 8.1*       Coags    Recent Labs     10/10/23  1642   INR 0.92   PTT 24        Additional Electrolytes  Recent Labs     10/10/23  1642 10/11/23  0447   MG 1.9 2.0          Blood Gas    No recent results  No recent results LFTs  Recent Labs     10/11/23  0447 10/12/23  0327   * 59*   AST 40* 23   ALKPHOS 43 35   ALB 4.0 3.3*   TBILI 0.76 1.33*       Infectious  Recent Labs     10/12/23  1014   PROCALCITONI 0.47*     Glucose  Recent Labs     10/10/23  1642 10/11/23  0447 10/12/23  0327   GLUC 138 127 116           Anticipated Length of Stay is > 2 [address] [facility],[address], CRNP

## 2023-10-12 NOTE — QUICK NOTE
Pt was asked about code status on 12OCT23 at 14:25. Pt noted I do not want CPR and I do NOT want to be intubated. Pt asked if he was certain? Pt replied "yes" and restated "no CPR nor Intubation". Pt asked if he wanted time to think about his decision? Pt replied "I have thought about it a lot previously" pt told we would respect his decision however he is young and could recover. Pt again said he wanted to be DNR/DNI. Pt was told if he changes his mind please let us know.

## 2023-10-12 NOTE — DISCHARGE INSTR - OTHER ORDERS
Crisis Information  Emergency Crisis Services: [phone]  After hours (4:30 p.m.-8:30 a.m. and weekends/holidays):  [phone] or [phone]  Emergency crisis services are available 24 hours a day, 7 days a week, and 365 days a year. Walk-ins go to the rear of [address] [place] [address]. (behind [address] [facility] on [address] ). Need Help Now? If you or someone you care about is having a problem with drugs or alcohol, there are several ways to help: The [facility] Alcohol Program can help walk you through your options for getting help. You can get further information by calling [phone], Monday through Friday, 8:30 AM to 4:30 PM.  If it is after hours or a holiday, you can contact [facility] at 4-160-4 HELP ([phone]) for further assistance. If it is after hours and a medical emergency you should seek help from your local hospital emergency room. If you have Health Insurance or [address] [place] [address], you can get further information on accessing treatment by contacting the number on the back of your card listed under [facility] or Substance Abuse Services”.   For further information on available services, refer to [place] Department of Drug & Alcohol Programs website at [facility].maddison Modi Assessment Providers:  [employer]  [phone] Fax  [address] [place] [address]  [address] 70 [facility].  [phone] Fax  [address] [facility], [address] [place]  [phone] Fax  3211 [facility], [address] [facility], [address] [facility]  [phone] Fax  Good [address]  [address] [place] [address]   [address], [address] [facility]   Pathway to [address] [place] [address] Street and [address] [facility] [address]  [phone] Fax  [address] [facility], [address] [facility]  [phone] Fax  130 [facility]  [address]   [address] [facility], [address] [place]  [phone] Fax  001 [facility], [address] [facility] [place]       Medication-Assisted Treatment  Medication-assisted treatment (MAT) is the use of medications, in combination with counseling and behavioral therapy, which is effective in the treatment of opioid use disorders (OUD) and can help some people to sustain recovery       Local MAT providers:  [facility]    What can you do? Reach out, if you think someone you know has a problem. Talk to family members, friends, or a health care professional. The earlier treatment begins, the better outcomes are likely to be. Understand that treatment is effective and substance use disorders can be effectively treated with behavioral therapies.

## 2023-10-12 NOTE — EMTALA/ACUTE CARE TRANSFER
[facility] INPATIENT DETOX  [address] [facility] [address] [phone]  Dept: [phone]      ACUTE CARE TRANSFER CONSENT    NAME Renee Joshua                                         1985                              MRN [id number]    I have been informed of my rights regarding examination, treatment, and transfer   by Dr. Josr Ross,     Benefits:  higher level of care    Risks:        Consent for Transfer:  I acknowledge that my medical condition has been evaluated and explained to me by the treating physician or other qualified medical person and/or my attending physician, who has recommended that I be transferred to the service of    at  . The above potential benefits of such transfer, the potential risks associated with such transfer, and the probable risks of not being transferred have been explained to me, and I fully understand them. The doctor has explained that, in my case, the benefits of transfer outweigh the risks. I agree to be transferred. I authorize the performance of emergency medical procedures and treatments upon me in both transit and upon arrival at the receiving facility. Additionally, I authorize the release of any and all medical records to the receiving facility and request they be transported with me, if possible. I understand that the safest mode of transportation during a medical emergency is an ambulance and that the Hospital advocates the use of this mode of transport. Risks of traveling to the receiving facility by car, including absence of medical control, life sustaining equipment, such as oxygen, and medical personnel has been explained to me and I fully understand them. (DIANE CORRECT BOX BELOW)  [  ]  I consent to the stated transfer and to be transported by ambulance/helicopter.   [  ]  I consent to the stated transfer, but refuse transportation by ambulance and accept full responsibility for my transportation by car.  I understand the risks of non-ambulance transfers and I exonerate the Hospital and its staff from any deterioration in my condition that results from this refusal.    X___________________________________________    DATE  10/12/23  TIME________  Signature of patient or legally responsible individual signing on patient behalf           RELATIONSHIP TO PATIENT_________________________          Provider Certification    NAME Yosef Whiteside                                         1985                              MRN [id number]    A medical screening exam was performed on the above named patient. Based on the examination:    Condition Necessitating Transfer necrotizing pancreatitis, sepsis     Patient Condition:  stable    Reason for Transfer:  need for higher level of care     Transfer Requirements: [address] available and qualified personnel available for treatment as acknowledged by    Agreed to accept transfer and to provide appropriate medical treatment as acknowledged by          Appropriate medical records of the examination and treatment of the patient are provided at the time of transfer   [address] _______  Transfer will be performed by qualified personnel from    and appropriate transfer equipment as required, including the use of necessary and appropriate life support measures.     Provider Certification: I have examined the patient and explained the following risks and benefits of being transferred/refusing transfer to the patient/family:         Based on these reasonable risks and benefits to the patient and/or the unborn child(era), and based upon the information available at the time of the patient’s examination, I certify that the medical benefits reasonably to be expected from the provision of appropriate medical treatments at another medical facility outweigh the increasing risks, if any, to the individual’s medical condition, and in the case of labor to the unborn child, from effecting the transfer.     X____________________________________________ DATE 10/12/23        TIME_______      ORIGINAL - SEND TO MEDICAL RECORDS   COPY - SEND WITH PATIENT DURING TRANSFER

## 2023-10-12 NOTE — ASSESSMENT & PLAN NOTE
AST and ALT elevated on admission, improving, bilirubin with slight uptrend. Likely 2/2 chronic alcohol use/alcoholic liver disease.  CT A/P 10/10 showed hepatic steatosis without mass, abnormal contours, or biliary duct dilation     Monitor CMP  Encourage alcohol cessation

## 2023-10-12 NOTE — NURSING NOTE
Report given to receiving RN Enedina Dupree. Transport has been given report along with transfer documents. Patient medicated for pain prior to leaving. Patient updated on transport. Denies questions.

## 2023-10-12 NOTE — ASSESSMENT & PLAN NOTE
Pt with a h/o chronic heavy alcohol use, Drinks 8 beers (16 oz) + 8 shots of whiskey daily.  Last drink 10/9     Plan  CIWA with Ativan PRN   Continue IVFs,   thiamine/folic acid supplementation  PPI prophylaxis   Continue to monitor CMP

## 2023-10-12 NOTE — PLAN OF CARE
Problem: MOBILITY - ADULT  Goal: Maintain or return to baseline ADL function  Description: INTERVENTIONS:  -  Assess patient's ability to carry out ADLs; assess patient's baseline for ADL function and identify physical deficits which impact ability to perform ADLs (bathing, care of mouth/teeth, toileting, grooming, dressing, etc.)  - Assess/evaluate cause of self-care deficits   - Assess range of motion  - Assess patient's mobility; develop plan if impaired  - Assess patient's need for assistive devices and provide as appropriate  - Encourage maximum independence but intervene and supervise when necessary  - Involve family in performance of ADLs  - Assess for home care needs following discharge   - Consider OT consult to assist with ADL evaluation and planning for discharge  - Provide patient education as appropriate  Outcome: Progressing  Goal: Maintains/Returns to pre admission functional level  Description: INTERVENTIONS:  - Perform BMAT or MOVE assessment daily.   - Set and communicate daily mobility goal to care team and patient/family/caregiver. - Collaborate with rehabilitation services on mobility goals if consulted  - Perform Range of Motion 3 times a day. - Reposition patient every 2 hours.   - Dangle patient 3 times a day  - Stand patient 3 times a day  - Ambulate patient 3 times a day  - Out of bed to chair 3 times a day   - Out of bed for meals 3 times a day  - Out of bed for toileting  - Record patient progress and toleration of activity level   Outcome: Progressing     Problem: PAIN - ADULT  Goal: Verbalizes/displays adequate comfort level or baseline comfort level  Description: Interventions:  - Encourage patient to monitor pain and request assistance  - Assess pain using appropriate pain scale  - Administer analgesics based on type and severity of pain and evaluate response  - Implement non-pharmacological measures as appropriate and evaluate response  - Consider cultural and social influences on pain and pain management  - Notify physician/advanced practitioner if interventions unsuccessful or patient reports new pain  Outcome: Progressing     Problem: INFECTION - ADULT  Goal: Absence or prevention of progression during hospitalization  Description: INTERVENTIONS:  - Assess and monitor for signs and symptoms of infection  - Monitor lab/diagnostic results  - Monitor all insertion sites, i.e. indwelling lines, tubes, and drains  - Monitor endotracheal if appropriate and nasal secretions for changes in amount and color  - [place] appropriate cooling/warming therapies per order  - Administer medications as ordered  - Instruct and encourage patient and family to use good hand hygiene technique  - Identify and instruct in appropriate isolation precautions for identified infection/condition  Outcome: Progressing  Goal: Absence of fever/infection during neutropenic period  Description: INTERVENTIONS:  - Monitor WBC    Outcome: Progressing     Problem: SAFETY ADULT  Goal: Maintain or return to baseline ADL function  Description: INTERVENTIONS:  -  Assess patient's ability to carry out ADLs; assess patient's baseline for ADL function and identify physical deficits which impact ability to perform ADLs (bathing, care of mouth/teeth, toileting, grooming, dressing, etc.)  - Assess/evaluate cause of self-care deficits   - Assess range of motion  - Assess patient's mobility; develop plan if impaired  - Assess patient's need for assistive devices and provide as appropriate  - Encourage maximum independence but intervene and supervise when necessary  - Involve family in performance of ADLs  - Assess for home care needs following discharge   - Consider OT consult to assist with ADL evaluation and planning for discharge  - Provide patient education as appropriate  Outcome: Progressing  Goal: Maintains/Returns to pre admission functional level  Description: INTERVENTIONS:  - Perform BMAT or MOVE assessment daily.   - Set and communicate daily mobility goal to care team and patient/family/caregiver. - Collaborate with rehabilitation services on mobility goals if consulted  - Perform Range of Motion 3 times a day. - Reposition patient every 2 hours.   - Dangle patient 3 times a day  - Stand patient 3 times a day  - Ambulate patient 3 times a day  - Out of bed to chair 3 times a day   - Out of bed for meals 3 times a day  - Out of bed for toileting  - Record patient progress and toleration of activity level   Outcome: Progressing  Goal: Patient will remain free of falls  Description: INTERVENTIONS:  - Educate patient/family on patient safety including physical limitations  - Instruct patient to call for assistance with activity   - Consult OT/PT to assist with strengthening/mobility   - Keep Call bell within reach  - Keep bed low and locked with side rails adjusted as appropriate  - Keep care items and personal belongings within reach  - Initiate and maintain comfort rounds  - Make Fall Risk Sign visible to staff  - Offer Toileting every 2 Hours, in advance of need  - Initiate/Maintain bed alarm  - Obtain necessary fall risk management equipment  - Apply yellow socks and bracelet for high fall risk patients  - Consider moving patient to room near nurses station  Outcome: Progressing     Problem: DISCHARGE PLANNING  Goal: Discharge to home or other facility with appropriate resources  Description: INTERVENTIONS:  - Identify barriers to discharge w/patient and caregiver  - Arrange for needed discharge resources and transportation as appropriate  - Identify discharge learning needs (meds, wound care, etc.)  - Arrange for interpretive services to assist at discharge as needed  - Refer to Case Management Department for coordinating discharge planning if the patient needs post-hospital services based on physician/advanced practitioner order or complex needs related to functional status, cognitive ability, or social support system  Outcome: Progressing     Problem: Knowledge Deficit  Goal: Patient/family/caregiver demonstrates understanding of disease process, treatment plan, medications, and discharge instructions  Description: Complete learning assessment and assess knowledge base.   Interventions:  - Provide teaching at level of understanding  - Provide teaching via preferred learning methods  Outcome: Progressing     Problem: NEUROSENSORY - ADULT  Goal: Remains free of injury related to seizures activity  Description: INTERVENTIONS  - Maintain airway, patient safety  and administer oxygen as ordered  - Monitor patient for seizure activity, document and report duration and description of seizure to physician/advanced practitioner  - If seizure occurs,  ensure patient safety during seizure  - Reorient patient post seizure  - Seizure pads on all 4 side rails  - Instruct patient/family to notify RN of any seizure activity including if an aura is experienced  - Instruct patient/family to call for assistance with activity based on nursing assessment  - Administer anti-seizure medications if ordered    Outcome: Progressing     Problem: Prexisting or High Potential for Compromised Skin Integrity  Goal: Skin integrity is maintained or improved  Description: INTERVENTIONS:  - Identify patients at risk for skin breakdown  - Assess and monitor skin integrity  - Assess and monitor nutrition and hydration status  - Monitor labs   - Assess for incontinence   - Turn and reposition patient  - Assist with mobility/ambulation  - Relieve pressure over bony prominences  - Avoid friction and shearing  - Provide appropriate hygiene as needed including keeping skin clean and dry  - Evaluate need for skin moisturizer/barrier cream  - Collaborate with interdisciplinary team   - Patient/family teaching  - Consider wound care consult   Outcome: Progressing

## 2023-10-12 NOTE — ASSESSMENT & PLAN NOTE
Alcohol-induced acute pancreatitis with infected necrosis  Assessment & Plan    Patient with past medical history of ETOH abuse presented to [facility] ED with abdominal pain radiating to back for 2 days. Initial CT A/P showed "Peripancreatic inflammatory changes consistent with acute pancreatitis" without evidence of acute infection, necrosis, or pseudocyst formation. Patient transferred to [facility] for detox. 10/12 repeat CT showed new evidence of pancreatic necrosis, 3.6 x 3.1 cm, with worsening of extensive retroperitoneal fat stranding and edema.      NPO  Pain control- morphine IV PRN  Monitor for worsening signs or symptoms of necrosis    Daily CBC  Trend lactate   Consult general surgery to evaluate

## 2023-10-12 NOTE — ASSESSMENT & PLAN NOTE
Multifactorial secondary to ethanol withdrawal, pain, and sepsis. Given rales on exam and h/o AUD, will obtain echo to evaluate for cardiomyopathy.

## 2023-10-12 NOTE — PLAN OF CARE
Problem: MOBILITY - ADULT  Goal: Maintain or return to baseline ADL function  Description: INTERVENTIONS:  -  Assess patient's ability to carry out ADLs; assess patient's baseline for ADL function and identify physical deficits which impact ability to perform ADLs (bathing, care of mouth/teeth, toileting, grooming, dressing, etc.)  - Assess/evaluate cause of self-care deficits   - Assess range of motion  - Assess patient's mobility; develop plan if impaired  - Assess patient's need for assistive devices and provide as appropriate  - Encourage maximum independence but intervene and supervise when necessary  - Involve family in performance of ADLs  - Assess for home care needs following discharge   - Consider OT consult to assist with ADL evaluation and planning for discharge  - Provide patient education as appropriate  Outcome: Progressing  Goal: Maintains/Returns to pre admission functional level  Description: INTERVENTIONS:  - Perform BMAT or MOVE assessment daily.   - Set and communicate daily mobility goal to care team and patient/family/caregiver.    - Collaborate with rehabilitation services on mobility goals if consulted  - Perform Range of Motion   - Reposition patient every   - Dangle patient   - Stand patient   - Ambulate patient   - Out of bed to chair    - Out of bed for meals   - Out of bed for toileting  - Record patient progress and toleration of activity level   Outcome: Progressing     Problem: PAIN - ADULT  Goal: Verbalizes/displays adequate comfort level or baseline comfort level  Description: Interventions:  - Encourage patient to monitor pain and request assistance  - Assess pain using appropriate pain scale  - Administer analgesics based on type and severity of pain and evaluate response  - Implement non-pharmacological measures as appropriate and evaluate response  - Consider cultural and social influences on pain and pain management  - Notify physician/advanced practitioner if interventions unsuccessful or patient reports new pain  Outcome: Progressing     Problem: SAFETY ADULT  Goal: Maintain or return to baseline ADL function  Description: INTERVENTIONS:  -  Assess patient's ability to carry out ADLs; assess patient's baseline for ADL function and identify physical deficits which impact ability to perform ADLs (bathing, care of mouth/teeth, toileting, grooming, dressing, etc.)  - Assess/evaluate cause of self-care deficits   - Assess range of motion  - Assess patient's mobility; develop plan if impaired  - Assess patient's need for assistive devices and provide as appropriate  - Encourage maximum independence but intervene and supervise when necessary  - Involve family in performance of ADLs  - Assess for home care needs following discharge   - Consider OT consult to assist with ADL evaluation and planning for discharge  - Provide patient education as appropriate  Outcome: Progressing  Goal: Maintains/Returns to pre admission functional level  Description: INTERVENTIONS:  - Perform BMAT or MOVE assessment daily.   - Set and communicate daily mobility goal to care team and patient/family/caregiver.    - Collaborate with rehabilitation services on mobility goals if consulted  - Perform Range of Motion   - Reposition patient every   - Dangle patient   - Stand patient   - Ambulate patient   - Out of bed to chair   - Out of bed for meals   - Out of bed for toileting  - Record patient progress and toleration of activity level   Outcome: Progressing     Problem: NEUROSENSORY - ADULT  Goal: Remains free of injury related to seizures activity  Description: INTERVENTIONS  - Maintain airway, patient safety  and administer oxygen as ordered  - Monitor patient for seizure activity, document and report duration and description of seizure to physician/advanced practitioner  - If seizure occurs,  ensure patient safety during seizure  - Reorient patient post seizure  - Seizure pads on all 4 side rails  - Instruct patient/family to notify RN of any seizure activity including if an aura is experienced  - Instruct patient/family to call for assistance with activity based on nursing assessment  - Administer anti-seizure medications if ordered    Outcome: Progressing

## 2023-10-12 NOTE — PROGRESS NOTES
PROGRESS NOTE  DEPARTMENT OF MEDICAL TOXICOLOGY  LEVEL 4 MEDICAL DETOX UNIT  Junito Casillas 43 y.o. male MRN: [id number]  Unit/Bed#:  DETOX 237-06 Encounter: [id number]      Reason for Admission/Principal Problem: Pancreatitis  Rounding Provider: Major Rodriguez DO  Attending Provider: Major Rodriguez DO   10/10/2023 11:12 PM           Tachycardia  Assessment & Plan  Multifactorial secondary to ethanol withdrawal, pain, and sepsis. Given rales on exam and h/o AUD, will obtain echo to evaluate for cardiomyopathy. Thrombocytopenia ([address])  Assessment & Plan  Associated with ETOH WD, as anticipated. Continue to monitor. Sepsis with acute organ dysfunction without septic shock (HCC)  Assessment & Plan  Leukocytosis monitored in the setting of ETOH wd and ETOH pancreatitis, increasing. No initial evidence of active infection but fever present today, concern exists for possible PNA despite negative CXR, possible viral infection, worsening pancreatitis. Follow up additional diagnostics: CT chest, abdomen, pelvis, blood cultures, lactate, procal, viral studies. Empiric antibiotics started: ceftriaxone, will adjust further per additional findings.   Continue monitoring CBC, VS, symptoms    Elevated LFTs  Assessment & Plan  Lab Results   Component Value Date    ALT 59 (H) 10/12/2023    AST 23 10/12/2023    ALKPHOS 35 10/12/2023    TBILI 1.33 (H) 10/12/2023        AST and ALT improving, bilirubin with slight uptrend, continue ot monitor CMP  Likely 2/2 chronic alcohol use/alcoholic liver disease  CT A/P 10/10 showed hepatic steatosis without mass, abnormal contours, or biliary duct dilation  Encourage alcohol cessation    Alcohol use disorder, severe, dependence (HCC)  Assessment & Plan  Pt with a h/o chronic heavy alcohol use  Drinks 8 beers (16 oz) + 8 shots of whiskey daily  Denies H/o withdrawal seizures  Hx of prior Vivitrol MAT for AUD, with adverse reaction of feeling unwell for most of the month when he was on this medication   Declines naltrexone at this time   Expresses potential interested in acamprosate as alternative MAT  Withdrawal management as above  Continue IVFs, thiamine/folic acid supplementation, and MVI  Consult case management for assistance with aftercare resources - pt contemplating inpatient v. Outpatient rehab at this time    Alcohol withdrawal syndrome with complication [facility])  Assessment & Plan  Last drink 10/9 around 0000  Serum alcohol <10 in the ED  Continue SEWS protocol for medical management of alcohol withdrawal  Most recent score 0  Likely to continue improving from that regard and be off protocol in less than 24 hours. Continuous pulse ox and telemetry monitoring    Anxiety  Assessment & Plan  Pt with a hx of anxiety  Home medication per chart review of Atarax 50 mg Q6H PRN anxiety   Reports he takes it at least once per day QHS for sleep and intermittently during the day as needed  Denies SI/HI/thoughts of self harm  Will hold Atarax while pt is undergoing acute alcohol withdrawal treatment with phenobarbital   May resume once w/d has stabilized/improved    Current smoker  Assessment & Plan  Daily tobacco user   Offer NRT  Encourage cessation    Hypertension  Assessment & Plan  Patient with a history of HTN  Continue current home medication regimen: metoprolol 50 mg BID and lisinopril 10 mg daily  Most Recent Blood Pressure: 128/78    * Alcohol-induced acute pancreatitis with infected necrosis  Assessment & Plan  Abdominal pain/tenderness persists. Lipase continues to downtrend  Initial CT A/P showed "Peripancreatic inflammatory changes consistent with acute pancreatitis" without evidence of acute infection, necrosis, or pseudocyst formation. Continue IVF, clear liquid diet, advance as tolerated  Pain control- PRN Tylenol, morphine IV providing relieve.  DC toradol d/t additional risk of gastritis in setting of AUD  New evidence of pancreatic necrosis on CT          Minutes of critical care time 50  -Critical care time was exclusive of separately billable procedures and teaching time.   -Critical care was necessary to treat or prevent imminent or life-threatening deterioration of the following condition: withdrawal, hepatic failure, and sepsis  -Critical care time was spent personally by me on the following activities as well as the above as per the course and rest of chart: obtaining history from patient/surrogate, review of old charts, development of a treatment plan, discussions with referring provider(s) and/or consultants, examination of the patient, performing treatments and interventions, evaluation of patient's response to the treatment, re-evaluation of the patient's condition, ordering/interpreting laboratory studies, ordering/interpreting of radiographic studies. VTE Pharmacologic Prophylaxis:   Pharmacologic: Enoxaparin (Lovenox)  Mechanical VTE Prophylaxis in Place: no    Code Status: Level 1 - Full Code    Patient Centered Rounds: I have performed bedside rounds with nursing staff today. Discussions with Specialists or Other Care Team Provider: LICHA    Education and Discussions with Family / Patient: Patient    Time Spent for Care: 1 hour. More than 50% of total time spent on counseling and coordination of care as described above. Current Length of Stay: 2 day(s)    Current Patient Status: Inpatient     Certification Statement: The patient will continue to require additional inpatient hospital stay due to sepsis and persistent pancreatitis  Discharge Plan: Likely be in hospital for at least another 48 hours pending blood cultures. Subjective:   Patient is feeling much better from alcohol withdrawal.  He continues to have abdominal pain. He had a fever this morning and on further review, has been having a slight cough. Also reports nasal congestion.     Objective:       SEWS Total Score: 0 (10/12/2023  9:17 AM)        Last 24 Hours Medication List:   Current Facility-Administered Medications   Medication Dose Route Frequency Provider Last Rate    acetaminophen  650 mg Oral Q6H PRN Brendan Alcala, TEJ      aluminum-magnesium hydroxide-simethicone  30 mL Oral Q6H PRN Allegra Anthonette Loosen, PA-SVETA      dextrose 5 % and sodium chloride 0.9 %  100 mL/hr Intravenous Continuous Obioma Yang EnglishTEJ dangelo 100 mL/hr (10/12/23 0314)    enoxaparin  40 mg Subcutaneous Daily Allegra Anthonette Loosen, PA-C      folic acid 1 mg, thiamine (VITAMIN B1) 100 mg in sodium chloride 0.9 % 100 mL IV piggyback   Intravenous Daily Allegra Anthonette Loosen, PA-C      lisinopril  10 mg Oral Daily Allegra Anthonette Loosen, PA-C      metoprolol tartrate  50 mg Oral Q12H [address] Allegra Anthonette Loosen, PA-C      morphine injection  6 mg Intravenous Q4H PRN Allegra Anthonette Loosen, PA-C      multivitamin-minerals  1 tablet Oral Daily Allegra Anthonette Loosen, [place]      nicotine  1 patch Transdermal Daily Allegra Anthonette Loosen, PA-C      ondansetron  4 mg Intravenous Q6H PRN Allegra Anthonette Loosen, PA-C      piperacillin-tazobactam  3.375 g Intravenous Q6H [place] Nappe, DO      traZODone  50 mg Oral HS PRN Allegra Anthonette Loosen, PA-C           Vitals:   Temp (24hrs), Av.6 °F (37.6 °C), Min:98.4 °F (36.9 °C), Max:100.8 °F (38.2 °C)    Temp:  [98.4 °F (36.9 °C)-100.8 °F (38.2 °C)] 100 °F (37.8 °C)  HR:  [105-133] 115  Resp:  [18-20] 18  BP: (128-168)/() 131/72  SpO2:  [92 %-97 %] 92 %  Body mass index is 35.26 kg/m². Input and Output Summary (last 24 hours):No intake or output data in the 24 hours ending 10/12/23 1151    Physical Exam:   Physical Exam  Vitals and nursing note reviewed. Constitutional:       Appearance: He is not toxic-appearing. HENT:      Head: Normocephalic. Nose: Congestion present. Mouth/Throat:      Mouth: Mucous membranes are moist.   Eyes:      Extraocular Movements: Extraocular movements intact.       Conjunctiva/sclera: Conjunctivae normal.      Pupils: Pupils are equal, round, and reactive to light. Cardiovascular:      Rate and Rhythm: Regular rhythm. Tachycardia present. Pulmonary:      Effort: Pulmonary effort is normal.      Breath sounds: Examination of the right-lower field reveals rales. Examination of the left-lower field reveals rales. Rales present. Abdominal:      General: Abdomen is flat. There is distension. Palpations: Abdomen is soft. Tenderness: There is abdominal tenderness. There is guarding. Musculoskeletal:         General: No swelling or tenderness. Normal range of motion. Cervical back: Normal range of motion. Skin:     General: Skin is warm and dry. Neurological:      General: No focal deficit present. Mental Status: He is alert and oriented to person, place, and time. Psychiatric:         Attention and Perception: Attention normal.         Mood and Affect: Mood is anxious. Speech: Speech normal.         Behavior: Behavior normal. Behavior is cooperative. Thought Content: Thought content normal.         Cognition and Memory: Cognition normal.         Additional Data:     Labs:   Results from last 7 days   Lab Units 10/12/23  0327 10/11/23  0447 10/10/23  1642   WBC Thousand/uL 13.33*   < > 11.11*   HEMOGLOBIN g/dL 15.4   < > 16.4   HEMATOCRIT % 45.2   < > 46.3   PLATELETS Thousands/uL 145*   < > 175   NEUTROS PCT %  --   --  76*   LYMPHS PCT %  --   --  15   MONOS PCT %  --   --  7   EOS PCT %  --   --  1    < > = values in this interval not displayed.       Results from last 7 days   Lab Units 10/12/23  0327   SODIUM mmol/L 134*   POTASSIUM mmol/L 3.6   CHLORIDE mmol/L 101   CO2 mmol/L 27   BUN mg/dL 8   CREATININE mg/dL 0.72   ANION GAP mmol/L 6   CALCIUM mg/dL 8.1*   ALBUMIN g/dL 3.3*   TOTAL BILIRUBIN mg/dL 1.33*   ALK PHOS U/L 35   ALT U/L 59*   AST U/L 23   GLUCOSE RANDOM mg/dL 116      Results from last 7 days   Lab Units 10/10/23  1642   INR  0.92 Results from last 7 days   Lab Units 10/12/23  1014 10/10/23  1842 10/10/23  1642   LACTIC ACID mmol/L 0.7 1.3 2.3*   PROCALCITONIN ng/ml 0.47*  --   --           * I Have Reviewed All Lab Data Listed Above. * Additional Pertinent Lab Tests Reviewed: [facility] Admission Reviewed      Imaging Studies: I have personally reviewed pertinent reports. Today, Patient Was Seen By: Kamron Murrell DO    ** Please Note: Dictation voice to text software may have been used in the creation of this document.  **

## 2023-10-13 PROBLEM — I10 HTN, GOAL BELOW 140/90: Status: ACTIVE | Noted: 2022-11-15

## 2023-10-13 PROBLEM — F41.9 ANXIETY: Status: ACTIVE | Noted: 2022-11-15

## 2023-10-13 PROBLEM — R74.01 TRANSAMINITIS: Status: ACTIVE | Noted: 2021-07-14

## 2023-10-13 PROBLEM — K42.9 UMBILICAL HERNIA WITHOUT OBSTRUCTION AND WITHOUT GANGRENE: Status: ACTIVE | Noted: 2022-11-15

## 2023-10-13 PROBLEM — F10.11 HISTORY OF ALCOHOL ABUSE: Status: ACTIVE | Noted: 2022-11-15

## 2023-10-13 PROBLEM — K56.609 SBO (SMALL BOWEL OBSTRUCTION) (HCC): Status: ACTIVE | Noted: 2021-07-13

## 2023-10-13 PROBLEM — K85.90 ACUTE PANCREATITIS: Status: ACTIVE | Noted: 2021-07-06

## 2023-10-13 PROBLEM — D64.9 NORMOCYTIC ANEMIA: Status: ACTIVE | Noted: 2021-07-14

## 2023-10-13 LAB
ALBUMIN SERPL BCP-MCNC: 3.3 G/DL (ref 3.5–5)
ALP SERPL-CCNC: 37 U/L (ref 34–104)
ALT SERPL W P-5'-P-CCNC: 37 U/L (ref 7–52)
ANION GAP SERPL CALCULATED.3IONS-SCNC: 8 MMOL/L
AST SERPL W P-5'-P-CCNC: 16 U/L (ref 13–39)
BASOPHILS # BLD AUTO: 0.02 THOUSANDS/ÂΜL (ref 0–0.1)
BASOPHILS NFR BLD AUTO: 0 % (ref 0–1)
BILIRUB SERPL-MCNC: 1.59 MG/DL (ref 0.2–1)
BUN SERPL-MCNC: 7 MG/DL (ref 5–25)
CALCIUM ALBUM COR SERPL-MCNC: 8.5 MG/DL (ref 8.3–10.1)
CALCIUM SERPL-MCNC: 7.9 MG/DL (ref 8.4–10.2)
CHLORIDE SERPL-SCNC: 100 MMOL/L (ref 96–108)
CO2 SERPL-SCNC: 27 MMOL/L (ref 21–32)
CREAT SERPL-MCNC: 0.59 MG/DL (ref 0.6–1.3)
EOSINOPHIL # BLD AUTO: 0.05 THOUSAND/ÂΜL (ref 0–0.61)
EOSINOPHIL NFR BLD AUTO: 0 % (ref 0–6)
ERYTHROCYTE [DISTWIDTH] IN BLOOD BY AUTOMATED COUNT: 14.4 % (ref 11.6–15.1)
GFR SERPL CREATININE-BSD FRML MDRD: 127 ML/MIN/1.73SQ M
GLUCOSE SERPL-MCNC: 96 MG/DL (ref 65–140)
HCT VFR BLD AUTO: 41.5 % (ref 36.5–49.3)
HGB BLD-MCNC: 13.5 G/DL (ref 12–17)
IMM GRANULOCYTES # BLD AUTO: 0.07 THOUSAND/UL (ref 0–0.2)
IMM GRANULOCYTES NFR BLD AUTO: 1 % (ref 0–2)
LYMPHOCYTES # BLD AUTO: 1.78 THOUSANDS/ÂΜL (ref 0.6–4.47)
LYMPHOCYTES NFR BLD AUTO: 15 % (ref 14–44)
MCH RBC QN AUTO: 31 PG (ref 26.8–34.3)
MCHC RBC AUTO-ENTMCNC: 32.5 G/DL (ref 31.4–37.4)
MCV RBC AUTO: 95 FL (ref 82–98)
MONOCYTES # BLD AUTO: 1.17 THOUSAND/ÂΜL (ref 0.17–1.22)
MONOCYTES NFR BLD AUTO: 10 % (ref 4–12)
NEUTROPHILS # BLD AUTO: 8.76 THOUSANDS/ÂΜL (ref 1.85–7.62)
NEUTS SEG NFR BLD AUTO: 74 % (ref 43–75)
NRBC BLD AUTO-RTO: 0 /100 WBCS
PLATELET # BLD AUTO: 143 THOUSANDS/UL (ref 149–390)
PMV BLD AUTO: 10.2 FL (ref 8.9–12.7)
POTASSIUM SERPL-SCNC: 3.4 MMOL/L (ref 3.5–5.3)
PROT SERPL-MCNC: 6.4 G/DL (ref 6.4–8.4)
RBC # BLD AUTO: 4.35 MILLION/UL (ref 3.88–5.62)
SODIUM SERPL-SCNC: 135 MMOL/L (ref 135–147)
WBC # BLD AUTO: 11.85 THOUSAND/UL (ref 4.31–10.16)

## 2023-10-13 PROCEDURE — 99232 SBSQ HOSP IP/OBS MODERATE 35: CPT | Performed by: INTERNAL MEDICINE

## 2023-10-13 PROCEDURE — 80053 COMPREHEN METABOLIC PANEL: CPT

## 2023-10-13 PROCEDURE — 99233 SBSQ HOSP IP/OBS HIGH 50: CPT | Performed by: SURGERY

## 2023-10-13 PROCEDURE — 85025 COMPLETE CBC W/AUTO DIFF WBC: CPT

## 2023-10-13 PROCEDURE — C9113 INJ PANTOPRAZOLE SODIUM, VIA: HCPCS | Performed by: NURSE PRACTITIONER

## 2023-10-13 PROCEDURE — 94762 N-INVAS EAR/PLS OXIMTRY CONT: CPT

## 2023-10-13 RX ORDER — OXAZEPAM 15 MG/1
15 CAPSULE ORAL EVERY 8 HOURS SCHEDULED
Status: DISCONTINUED | OUTPATIENT
Start: 2023-10-13 | End: 2023-10-15

## 2023-10-13 RX ADMIN — MORPHINE SULFATE 6 MG: 2 INJECTION, SOLUTION INTRAMUSCULAR; INTRAVENOUS at 11:53

## 2023-10-13 RX ADMIN — MORPHINE SULFATE 6 MG: 2 INJECTION, SOLUTION INTRAMUSCULAR; INTRAVENOUS at 08:44

## 2023-10-13 RX ADMIN — OXAZEPAM 15 MG: 15 CAPSULE ORAL at 22:13

## 2023-10-13 RX ADMIN — OXAZEPAM 15 MG: 15 CAPSULE ORAL at 14:15

## 2023-10-13 RX ADMIN — Medication 21 MG: at 08:41

## 2023-10-13 RX ADMIN — SODIUM CHLORIDE, SODIUM LACTATE, POTASSIUM CHLORIDE, AND CALCIUM CHLORIDE 125 ML/HR: .6; .31; .03; .02 INJECTION, SOLUTION INTRAVENOUS at 20:09

## 2023-10-13 RX ADMIN — SODIUM CHLORIDE, SODIUM LACTATE, POTASSIUM CHLORIDE, AND CALCIUM CHLORIDE 125 ML/HR: .6; .31; .03; .02 INJECTION, SOLUTION INTRAVENOUS at 10:31

## 2023-10-13 RX ADMIN — LORAZEPAM 1 MG: 2 INJECTION INTRAMUSCULAR; INTRAVENOUS at 02:29

## 2023-10-13 RX ADMIN — MORPHINE SULFATE 6 MG: 2 INJECTION, SOLUTION INTRAMUSCULAR; INTRAVENOUS at 05:38

## 2023-10-13 RX ADMIN — CHLORHEXIDINE GLUCONATE 15 ML: 1.2 RINSE ORAL at 08:41

## 2023-10-13 RX ADMIN — MORPHINE SULFATE 6 MG: 2 INJECTION, SOLUTION INTRAMUSCULAR; INTRAVENOUS at 23:25

## 2023-10-13 RX ADMIN — FOLIC ACID: 5 INJECTION, SOLUTION INTRAMUSCULAR; INTRAVENOUS; SUBCUTANEOUS at 10:31

## 2023-10-13 RX ADMIN — MORPHINE SULFATE 6 MG: 2 INJECTION, SOLUTION INTRAMUSCULAR; INTRAVENOUS at 16:03

## 2023-10-13 RX ADMIN — MORPHINE SULFATE 6 MG: 2 INJECTION, SOLUTION INTRAMUSCULAR; INTRAVENOUS at 01:55

## 2023-10-13 RX ADMIN — PANTOPRAZOLE SODIUM 40 MG: 40 INJECTION, POWDER, FOR SOLUTION INTRAVENOUS at 08:41

## 2023-10-13 RX ADMIN — MORPHINE SULFATE 6 MG: 2 INJECTION, SOLUTION INTRAMUSCULAR; INTRAVENOUS at 20:04

## 2023-10-13 RX ADMIN — SODIUM CHLORIDE, SODIUM LACTATE, POTASSIUM CHLORIDE, AND CALCIUM CHLORIDE 125 ML/HR: .6; .31; .03; .02 INJECTION, SOLUTION INTRAVENOUS at 02:39

## 2023-10-13 RX ADMIN — ENOXAPARIN SODIUM 40 MG: 40 INJECTION SUBCUTANEOUS at 08:41

## 2023-10-13 RX ADMIN — LORAZEPAM 1 MG: 2 INJECTION INTRAMUSCULAR; INTRAVENOUS at 10:31

## 2023-10-13 RX ADMIN — SODIUM CHLORIDE, SODIUM LACTATE, POTASSIUM CHLORIDE, AND CALCIUM CHLORIDE 1000 ML: .6; .31; .03; .02 INJECTION, SOLUTION INTRAVENOUS at 00:35

## 2023-10-13 NOTE — CONSULTS
Consultation Note - General Surgery  : [facility]LA Surgery Resident role on [employer]  Fallon Vargas 43 y.o. male MRN: [id number]  Unit/Bed#: ICU 03 Encounter: [id number]        Assessment:  43y.o. 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 Tachycardic, blood pressures stable. Afebrile. WBC 13  Hgb 15  Lipase 990 from peak 3800  Cr: 0.72  T Bili 1.33 from 0.76     CT AP with pancreatic necrosis in the distal tail measuring 3.6x3.1cm, diffuse stranding. No gas in or around pancreas. Plan:  - NPO  - Continue IVF  - PRN analgesia and antiemetics  - CIWA protocol  - DVT ppx  - Encourage OOB and ambulation  - No indication for acute surgical intervention or drainage at this point in his course. Continue supportive care. - No role for Abx currently  - General surgery will continue to follow along    HPI:  Fallon Vargas is a 43 y.o. male with a history of alcoholic pancreatitis. He states his first episode was 2-3 years ago. He says the pain began Sunday and continued to worsen which prompted his visit. CT AP at that time showed acute uncomplicated appendicitis. Lipase 3800. He was subsequently transferred to [address] [place] [address] for detox. Patient endorses drinking 1.75L of liquor a day on most days. Patient with continued abdominal pain and tachycardia which prompted repeat CT which showed pancreatic necrosis in the distal tail measuring 3.6x3.1cm, diffuse stranding. No gas in or around pancreas. Lipase down trend to 990 from 3800. WBC 13. Cr: 0.72. Patient states his abdominal pain is slightly improved from presentation, he remains distended though he does endorse having an appetite. On exam, his abdomen is soft, distended, tender especially in the epigastrium. Physical Exam:  General: No acute distress, alert and oriented, protuberant  CV: Well perfused, tachycardia  Lungs: Normal work of breathing, no increased respiratory effort  Abdomen: Soft, tender in epigastrium, distended.  No rebound or guarding  Extremities: No clubbing or cyanosis  Skin: Warm, dry      Review of Systems   Review of systems negative except as per HPI. Objective         Intake/Output Summary (Last 24 hours) at 10/12/2023 2018  Last data filed at 10/12/2023 1844  Gross per 24 hour   Intake 412.5 ml   Output 540 ml   Net -127.5 ml       First Vitals:   Blood Pressure: 113/76 (10/12/23 1300)  Pulse: (!) 121 (10/12/23 1300)  Temperature: 99 °F (37.2 °C) (10/12/23 1300)  Temp Source: Oral (10/12/23 1300)  Respirations: 19 (10/12/23 1300)  Height: 6' (182.9 cm) (10/12/23 1300)  Weight - Scale: 119 kg (262 lb 2 oz) (10/12/23 1300)  SpO2: (!) 89 % (10/12/23 1300)    Current Vitals:   Blood Pressure: 140/77 (10/12/23 1912)  Pulse: (!) 122 (10/12/23 1912)  Temperature: 99.2 °F (37.3 °C) (10/12/23 1912)  Temp Source: Oral (10/12/23 1912)  Respirations: (!) 26 (10/12/23 1912)  Height: 6' (182.9 cm) (10/12/23 1408)  Weight - Scale: 119 kg (263 lb 0.1 oz) (10/12/23 1408)  SpO2: 93 % (10/12/23 1912)    Invasive Devices       Peripheral Intravenous Line  Duration             Peripheral IV 10/10/23 Left Antecubital 2 days    Peripheral IV 10/11/23 Proximal;Right;Ventral (anterior) Forearm 1 day                    Imaging: I have personally reviewed pertinent reports. CTA chest (pe study) abdomen pelvis contrast    Result Date: 10/12/2023  Impression: No pulmonary embolus. Mild bibasilar atelectasis and minimal pleural effusions. Mild pulmonary emphysema noted. Acute pancreatitis with interval development of necrosis (less than 30%) involving the tail. Interval worsening of extensive retroperitoneal fat stranding and edema. No discrete collections. The study was marked in [facility] for immediate notification. Workstation performed: [id number]     XR chest portable    Result Date: 10/12/2023  Impression: No acute cardiopulmonary disease.  Workstation performed: [id number]       EKG, Pathology, and Other Studies: I have personally reviewed pertinent reports. Historical Information   Past Medical History:   Diagnosis Date    Anxiety     COVID-19     5/2022    History of alcohol abuse     pt stopped on 9/10/22    Hypertension     Umbilical hernia      Past Surgical History:   Procedure Laterality Date    NO PAST SURGERIES      NH LAPS REPAIR HERNIA EXCEPT INCAL/INGUN REDUCIBLE N/A 12/20/2022    Procedure: ROBOTIC ASSISTED LAPAROSCOPIC UMBILICAL HERNIA REPAIR WITH MESH;  Surgeon: Noé Borja DO;  Location:  MAIN OR;  Service: General     Social History   Social History     Substance and Sexual Activity   Alcohol Use Yes    Comment: pt. voiced he has been consuming "a lot" of alcohol lately     Social History     Substance and Sexual Activity   Drug Use Never     Social History     Tobacco Use   Smoking Status Every Day    Packs/day: 1.00    Years: 20.00    Total pack years: 20.00    Types: Cigarettes   Smokeless Tobacco Never     No family history on file. Meds/Allergies   all current active meds have been reviewed, current meds:   Current Facility-Administered Medications   Medication Dose Route Frequency    chlorhexidine (PERIDEX) 0.12 % oral rinse 15 mL  15 mL Mouth/Throat Q12H [address]    [START ON 10/13/2023] enoxaparin (LOVENOX) subcutaneous injection 40 mg  40 mg Subcutaneous Daily    [START ON 57/70/5876] folic acid 1 mg, thiamine (VITAMIN B1) 100 mg in sodium chloride 0.9 % 100 mL IV piggyback   Intravenous Daily    lactated ringers infusion  125 mL/hr Intravenous Continuous    LORazepam (ATIVAN) injection 1 mg  1 mg Intravenous Q8H    morphine injection 6 mg  6 mg Intravenous Q3H PRN    [START ON 10/13/2023] nicotine (NICODERM CQ) 21 mg/24 hr TD 24 hr patch 21 mg  21 mg Transdermal Daily    ondansetron (ZOFRAN) injection 4 mg  4 mg Intravenous Q8H PRN    pantoprazole (PROTONIX) injection 40 mg  40 mg Intravenous Q24H [address]    and PTA meds:   Prior to Admission Medications   Prescriptions Last Dose Informant Patient Reported? Taking?   hydrOXYzine HCL (ATARAX) 25 mg tablet More than a month  Yes No   Sig: Take 25 mg by mouth daily at bedtime   ibuprofen (MOTRIN) 400 mg tablet More than a month  Yes No   Sig: Take by mouth every 6 (six) hours as needed for mild pain   lisinopril (ZESTRIL) 10 mg tablet 10/12/2023  Yes Yes   Sig: Take 10 mg by mouth daily   metoprolol tartrate (LOPRESSOR) 50 mg tablet 10/12/2023  Yes Yes   Sig: Take 50 mg by mouth every 12 (twelve) hours   naloxone (NARCAN) 0.04 mg/mL Not Taking  Yes No   Sig: Inject 0.04 mg into a catheter in a vein Q1MIN PRN   Patient not taking: Reported on 10/10/2023      Facility-Administered Medications: None     Allergies   Allergen Reactions    Shellfish-Derived Products - Food Allergy Anaphylaxis       Lab Results: I have personally reviewed pertinent lab results. , CBC:   Lab Results   Component Value Date    WBC 13.33 (H) 10/12/2023    HGB 15.4 10/12/2023    HCT 45.2 10/12/2023    MCV 91 10/12/2023     (L) 10/12/2023    RBC 4.96 10/12/2023    MCH 31.0 10/12/2023    MCHC 34.1 10/12/2023    RDW 14.6 10/12/2023    MPV 10.2 10/12/2023   , CMP:   Lab Results   Component Value Date    SODIUM 134 (L) 10/12/2023    K 3.6 10/12/2023     10/12/2023    CO2 27 10/12/2023    BUN 8 10/12/2023    CREATININE 0.72 10/12/2023    CALCIUM 8.1 (L) 10/12/2023    AST 23 10/12/2023    ALT 59 (H) 10/12/2023    ALKPHOS 35 10/12/2023    EGFR 117 10/12/2023       Counseling / Coordination of Care  Total floor / unit time spent today 25 minutes. Greater than 50% of total time was spent with the patient and / or family counseling and / or coordination of care.     Inpatient consult to Acute Care Surgery  Consult performed by: Milton Sanchez MD  Consult ordered by: Jerrica Mckeon MD  10/12/2023 8:18 PM

## 2023-10-13 NOTE — UTILIZATION REVIEW
Initial Clinical Review    Admission: Date/Time/Statement:   Admission Orders (From admission, onward)       Ordered        10/12/23 1432  Inpatient Admission  Once                          Orders Placed This Encounter   Procedures    Inpatient Admission     Standing Status:   Standing     Number of Occurrences:   1     Order Specific Question:   Level of Care     Answer:   Critical Care [15]     Order Specific Question:   Estimated length of stay     Answer:   More than 2 Midnights     Order Specific Question:   Certification     Answer:   I certify that inpatient services are medically necessary for this patient for a duration of greater than two midnights. See H&P and MD Progress Notes for additional information about the patient's course of treatment. ED Arrival Information       Patient not seen in ED                           Initial Presentation: 43 y.o. male pmh anxiety, chronic heavy alcohol intake @ baseline;  Drinks 8 beers (16 oz) + 8 shots of whiskey daily. Last drink 10/9  initial admit @ [address] [facility] for Detox , DXd w acute Pancreatitis w concern for Sepsis progression req ICU management    EXAM  Reports alcohol intake worse last few days, + epigastric pain radiating to back w decreased appetite, + guarding, tachycardia, hypertensive, hypoxia in room air w POX 89% req NC. elevated Lactate/ lipase/ pro natalia, leukocytosis  10/12 repeat CT a/p showed new evidence of pancreatic necrosis     Inpatient ICU admission due to alcohol induced acute pancreatitis w infected necrosis, alcohol use disorder, severe, elevated LFT, anxiety  Consult Gen surgery, NPO, IV Morphine PRN, monitor for worsening symptoms of necrosis; daily CBC, trend lactate. CIWA, thiamine & folic acid supplementation; repeat CMP. Cont home Hydroxyzine  GEN Surgery  No indication for acute surgical intervention or drainage at this point in his course. Continue IVF , prn analgesia, antiemetics & supportive care.   No role for Abx currently  General surgery will continue to follow along. OOB, ambulate  Date: 10/13/2023  Day 2:   GEN Surgery  distended abdomen but not peritoneal.  The necrotizing pancreatitis is in the tail the pancreas by report, and hopefully this will consolidate and he will not need any intervention. Step up approach. If he clinically deteriorates, consider interventional radiology for drainage but only if he deteriorates and developed intractable sepsis. Will discuss diet with the team.  Some studies show that early feeding is a reasonable course of action. `CL Liq diet NPO if worsening ABD pain. Currently no role for antibx  Critical care  Pain improving; cont CL Liq diet. IF tolerates start Serax &  CIWA, trend labs.   ED Triage Vitals   Temperature Pulse Respirations Blood Pressure SpO2   10/12/23 1300 10/12/23 1300 10/12/23 1300 10/12/23 1300 10/12/23 1300   99 °F (37.2 °C) (!) 121 19 113/76 (!) 89 %      Temp Source Heart Rate Source Patient Position - Orthostatic VS BP Location FiO2 (%)   10/12/23 1300 10/12/23 1300 10/12/23 1300 10/12/23 1300 --   Oral Monitor Lying Right arm       Pain Score       10/12/23 1344       6          Wt Readings from Last 1 Encounters:   10/13/23 122 kg (269 lb 6.4 oz)     Additional Vital Signs:   Date/Time Temp Pulse Resp BP MAP (mmHg) SpO2 Calculated FIO2 (%) - Nasal Cannula O2 Flow Rate (L/min) Nasal Cannula O2 Flow Rate (L/min) O2 Device Patient Position - Orthostatic VS   10/13/23 1153 98.2 °F (36.8 °C) 134 Abnormal  26 Abnormal  126/85 96 91 % 28 -- 2 L/min Nasal cannula Lying   10/13/23 0945 -- 122 Abnormal  18 143/78 96 92 % -- -- -- -- --   10/13/23 0845 98.5 °F (36.9 °C) 120 Abnormal  24 Abnormal  143/81 102 95 % 28 -- 2 L/min Nasal cannula Lying   10/13/23 0745 -- 122 Abnormal  20 156/83 119 93 % -- -- -- -- --   10/13/23 0638 -- 124 Abnormal  22 143/81 105 94 % -- -- -- -- --   10/13/23 0544 99.4 °F (37.4 °C) 122 Abnormal  26 Abnormal  -- -- 94 % -- -- -- -- -- 10/13/23 0438 -- 126 Abnormal  18 145/74 98 92 % -- -- -- -- --   10/13/23 0338 -- 124 Abnormal  22 148/71 94 93 % -- -- -- -- --   10/13/23 0238 -- 124 Abnormal  20 135/78 96 94 % -- -- -- -- --   10/13/23 0138 -- 122 Abnormal  22 158/88 106 95 % -- -- -- -- --   10/13/23 0038 -- 126 Abnormal  23 Abnormal  142/78 111 94 % -- -- -- -- --   10/12/23 2339 99.9 °F (37.7 °C) 130 Abnormal  22 135/84 101 92 % -- -- -- -- --   10/12/23 2238 -- 126 Abnormal  20 129/80 102 91 % -- -- -- -- --   10/12/23 2142 -- 122 Abnormal  24 Abnormal  127/86 101 94 % -- -- -- -- --   10/12/23 2038 -- 124 Abnormal  23 Abnormal  141/71 100 92 % -- -- -- -- --   10/12/23 1912 99.2 °F (37.3 °C) 122 Abnormal  26 Abnormal  140/77 95 93 % 28 -- 2 L/min Nasal cannula --   10/12/23 1834 -- 122 Abnormal  26 Abnormal  136/93 123 94 % -- -- -- -- Lying   10/12/23 1800 -- 120 Abnormal  19 -- -- 94 % -- -- -- -- --   10/12/23 1700 -- 122 Abnormal  26 Abnormal  138/77 91 93 % -- -- -- -- --   10/12/23 1600 99.5 °F (37.5 °C) 116 Abnormal  23 Abnormal  127/75 90 95 % -- 2 L/min -- Nasal cannula Lying   10/12/23 1500 -- 114 Abnormal  20 117/70 90 94 % -- -- -- -- --   10/12/23 1408 -- 116 Abnormal  19 -- -- 95 % -- 2 L/min -- Nasal cannula --   10/12/23 1400 -- 116 Abnormal  20 116/78 90 94 % -- -- -- -- --   10/12/23 1344 -- 122 Abnormal  25 Abnormal  113/76 89 89 % Abnormal  -- -- -- -- --   10/12/23 1300 99 °F (37.2 °C) 121 Abnormal  19 113/76 89 89 % Abnormal  -- -- -- -- Lying     Weights (last 14 days)    Date/Time Weight Weight Method Height   10/13/23 0544 122 kg (269 lb 6.4 oz) Bed scale --   10/12/23 1408 119 kg (263 lb 0.1 oz) Bed scale 6' (1.829 m)   10/12/23 1300 119 kg (262 lb 2 oz) Bed scale 6' (1.829 m)     CIWA=  CIWA-Ar Score    Row Name 10/13/23 1153 10/13/23 0945 10/13/23 0845 10/13/23 0745 10/13/23 0638   CIWA-Ar   /85  -/78  -/81  -/83  -/81  -AB   Pulse 134 Abnormal   - Abnormal   - Abnormal   - Abnormal   - Abnormal   -AB   Row Name 10/13/23 0544 10/13/23 0438 10/13/23 0338 10/13/23 0238 10/13/23 0138   [facility]   BP -- 145/74  -/71  -/78  -/88  -AB   Pulse 122 Abnormal   - Abnormal   - Abnormal   - Abnormal   - Abnormal   -AB   Row Name 10/13/23 0100 10/13/23 0038 10/12/23 2339 10/12/23 2238 10/12/23 2142   [facility]   BP -- 142/78  -/84  -/80  -/86  -AB   Pulse -- 126 Abnormal   - Abnormal   - Abnormal   - Abnormal   -AB   Nausea and Vomiting 0  -AB -- -- -- --   Tactile Disturbances 0  -AB -- -- -- --   Tremor 0  -AB -- -- -- --   Auditory Disturbances 0  -AB -- -- -- --   Paroxysmal Sweats 0  -AB -- -- -- --   Visual Disturbances 0  -AB -- -- -- --   Anxiety 0  -AB -- -- -- --   Headache, Fullness in Head 0  -AB -- -- -- --   Agitation 0  -AB -- -- -- --   Orientation and Clouding of Sensorium 0  -AB -- -- -- --   [employer] [facility] Total 0  -AB -- -- -- --   Row Name 10/12/23 2100 10/12/23 2038 10/12/23 1912 10/12/23 1834 10/12/23 1800   [facility]   BP -- 141/71  -/77  -/93  -LR --   Pulse -- 124 Abnormal   - Abnormal   - Abnormal   - Abnormal   -LR   Nausea and Vomiting 0  -AB -- -- 0  -LR --   Tactile Disturbances 0  -AB -- -- 0  -LR --   Tremor 0  -AB -- -- 0  -LR --   Auditory Disturbances 0  -AB -- -- 0  -LR --   Paroxysmal Sweats 0  -AB -- -- 0  -LR --   Visual Disturbances 0  -AB -- -- 0  -LR --   Anxiety 0  -AB -- -- 0  -LR --   Headache, Fullness in Head 0  -AB -- -- 0  -LR --   Agitation 0  -AB -- -- 0  -LR --   Orientation and Clouding of Sensorium 0  -AB -- -- 0  -LR --   CIWA-Ar Total 0  -AB -- -- 0  -LR --   Row Name 10/12/23 1700 10/12/23 1600 10/12/23 1500 10/12/23 1408 10/12/23 1400   CIWA-Ar   /77  -/75  -/70  -LR -- 116/78  -LR   Pulse 122 Abnormal   - Abnormal   - Abnormal   - Abnormal   - Abnormal   -LR   Nausea and Vomiting -- -- -- -- 0  -LR   Tactile Disturbances -- -- -- -- 0  -LR   Tremor -- -- -- -- 0  -LR   Auditory Disturbances -- -- -- -- 0  -LR   Paroxysmal Sweats -- -- -- -- 0  -LR   Visual Disturbances -- -- -- -- 0  -LR   Anxiety -- -- -- -- 0  -LR   Headache, Fullness in Head -- -- -- -- 0  -LR   Agitation -- -- -- -- 0  -LR   Orientation and Clouding of Sensorium -- -- -- -- 0  -LR   CIWA-Ar Total -- -- -- -- 0  -LR     Row Name 10/12/23 1344 10/12/23 1300       Pertinent Labs/Diagnostic Test Results:   No orders to display     Results from last 7 days   Lab Units 10/12/23  0953   SARS-COV-2  Negative     Results from last 7 days   Lab Units 10/13/23  0533 10/12/23  0327 10/11/23  0447 10/10/23  1642   WBC Thousand/uL 11.85* 13.33* 12.53* 11.11*   HEMOGLOBIN g/dL 13.5 15.4 17.0 16.4   HEMATOCRIT % 41.5 45.2 48.4 46.3   PLATELETS Thousands/uL 143* 145* 164 175   NEUTROS ABS Thousands/µL 8.76*  --   --  8.48*         Results from last 7 days   Lab Units 10/13/23  0533 10/12/23  0327 10/11/23  0447 10/10/23  1642   SODIUM mmol/L 135 134* 135 135   POTASSIUM mmol/L 3.4* 3.6 4.2 4.2   CHLORIDE mmol/L 100 101 102 103   CO2 mmol/L 27 27 26 23   ANION GAP mmol/L 8 6 7 9   BUN mg/dL 7 8 8 10   CREATININE mg/dL 0.59* 0.72 0.63 0.73   EGFR ml/min/1.73sq m 127 117 124 116   CALCIUM mg/dL 7.9* 8.1* 8.5 8.8   MAGNESIUM mg/dL  --   --  2.0 1.9     Results from last 7 days   Lab Units 10/13/23  0533 10/12/23  0327 10/11/23  0447 10/10/23  1642   AST U/L 16 23 40* 65*   ALT U/L 37 59* 106* 134*   ALK PHOS U/L 37 35 43 45   TOTAL PROTEIN g/dL 6.4 6.0* 6.7 7.0   ALBUMIN g/dL 3.3* 3.3* 4.0 4.1   TOTAL BILIRUBIN mg/dL 1.59* 1.33* 0.76 0.57         Results from last 7 days   Lab Units 10/13/23  0533 10/12/23  0327 10/11/23  0447 10/10/23  1642   GLUCOSE RANDOM mg/dL 96 116 127 138             No results found for: "BETA-HYDROXYBUTYRATE"                   Results from last 7 days   Lab Units 10/10/23  1842 10/10/23  1642   HS TNI 0HR ng/L  -- 3   HS TNI 2HR ng/L 4  --    HSTNI D2 ng/L 1  --          Results from last 7 days   Lab Units 10/10/23  1642   PROTIME seconds 12.7   INR  0.92   PTT seconds 24         Results from last 7 days   Lab Units 10/12/23  1014   PROCALCITONIN ng/ml 0.47*     Results from last 7 days   Lab Units 10/12/23  1014 10/10/23  1842 10/10/23  1642   LACTIC ACID mmol/L 0.7 1.3 2.3*                                 Results from last 7 days   Lab Units 10/12/23  0327 10/11/23  0447 10/10/23  1642   LIPASE u/L 990* 1,780* 3,832*                 Results from last 7 days   Lab Units 10/12/23  1040 10/10/23  1648   CLARITY UA  Clear Clear   COLOR UA  [place]* Yellow   SPEC GRAV UA  1.010 >=1.030   PH UA  6.0 5.5   GLUCOSE UA mg/dl Negative Negative   KETONES UA mg/dl Negative Negative   BLOOD UA  Negative Negative   PROTEIN UA mg/dl 30 (1+)* 30 (1+)*   NITRITE UA  Negative Negative   BILIRUBIN UA  Negative Small*   UROBILINOGEN UA mg/dL Negative 0.2   LEUKOCYTES UA  25.0* Negative   WBC UA /hpf 2-4 0-1   RBC UA /hpf 0-1 None Seen   BACTERIA UA /hpf Occasional None Seen   EPITHELIAL CELLS WET PREP /hpf Occasional None Seen   MUCUS THREADS  Occasional*  --      Results from last 7 days   Lab Units 10/12/23  0953   INFLUENZA A PCR  Negative   INFLUENZA B PCR  Negative   RSV PCR  Negative         Results from last 7 days   Lab Units 10/10/23  1647   AMPH/METH  Negative   BARBITURATE UR  Negative   BENZODIAZEPINE UR  Negative   COCAINE UR  Negative   METHADONE URINE  Negative   OPIATE UR  Negative   PCP UR  Negative   THC UR  Negative     Results from last 7 days   Lab Units 10/10/23  1642   ETHANOL LVL mg/dL <10                 Results from last 7 days   Lab Units 10/12/23  1013   BLOOD CULTURE  Received in Microbiology Lab. Culture in Progress. Received in Microbiology Lab. Culture in Progress.      ED Treatment:   Medication Administration - No Administrations Displayed (No Start Event Found)       None          Past Medical History: Diagnosis Date    Anxiety     COVID-19     5/2022    History of alcohol abuse     pt stopped on 9/10/22    Hypertension     Umbilical hernia      Present on Admission:   Alcohol-induced acute pancreatitis with infected necrosis   Alcohol use disorder, severe, dependence (HCC)   Anxiety   Elevated LFTs      Admitting Diagnosis: Alcohol-induced acute pancreatitis without infection or necrosis  Age/Sex: 43 y.o. male  Admission Orders:  Continuous cardiopulmonary & pulse oximetry  Neuro checks  CIWA  I/O  Daily wt  OOB ambulate    Scheduled Medications:  chlorhexidine, 15 mL, Mouth/Throat, Q12H PIETRO  enoxaparin, 40 mg, Subcutaneous, Daily  folic acid 1 mg, thiamine (VITAMIN B1) 100 mg in sodium chloride 0.9 % 100 mL IV piggyback, , Intravenous, Daily  LORazepam, 1 mg, Intravenous, Q8H  nicotine, 21 mg, Transdermal, Daily  pantoprazole, 40 mg, Intravenous, Q24H PIETRO      Continuous IV Infusions:  lactated ringers, 125 mL/hr, Intravenous, Continuous      PRN Meds:  morphine injection, 6 mg, Intravenous, Q3H PRN  ondansetron, 4 mg, Intravenous, Q8H PRN  IV = 10/12 x1  morphine injection 2 mg  Dose: 2 mg  Freq: Every 3 hours PRN Route: IV  PRN Reason: severe pain         IP CONSULT TO CASE MANAGEMENT  IP CONSULT TO ACUTE CARE SURGERY    Network Utilization Review Department  ATTENTION: Please call with any questions or concerns to [phone] and carefully listen to the prompts so that you are directed to the right person. All voicemails are confidential.   For Discharge needs, contact Care Management DC Support Team at [phone] opt. 2  Send all requests for admission clinical reviews, approved or denied determinations and any other requests to dedicated fax number below belonging to the campus where the patient is receiving treatment.  List of dedicated fax numbers for the Facilities:  [place] DENIALS (Administrative/Medical Necessity) [phone]   DISCHARGE SUPPORT TEAM (NETWORK) [id number] [facility] (Maternity/NICU/Pediatrics) [address] [facility] [phone] 1505 [facility] [address] [address] [facility] [address] [phone]

## 2023-10-13 NOTE — PLAN OF CARE
Problem: MOBILITY - ADULT  Goal: Maintain or return to baseline ADL function  Description: INTERVENTIONS:  -  Assess patient's ability to carry out ADLs; assess patient's baseline for ADL function and identify physical deficits which impact ability to perform ADLs (bathing, care of mouth/teeth, toileting, grooming, dressing, etc.)  - Assess/evaluate cause of self-care deficits   - Assess range of motion  - Assess patient's mobility; develop plan if impaired  - Assess patient's need for assistive devices and provide as appropriate  - Encourage maximum independence but intervene and supervise when necessary  - Involve family in performance of ADLs  - Assess for home care needs following discharge   - Consider OT consult to assist with ADL evaluation and planning for discharge  - Provide patient education as appropriate  Outcome: Progressing  Goal: Maintains/Returns to pre admission functional level  Description: INTERVENTIONS:  - Perform BMAT or MOVE assessment daily.   - Set and communicate daily mobility goal to care team and patient/family/caregiver. - Collaborate with rehabilitation services on mobility goals if consulted  - Perform Range of Motion 4 times a day. - Reposition patient every 2 hours.   - Dangle patient 2 times a day  - Stand patient 2 times a day  - Ambulate patient 2 times a day  - Out of bed to chair 2 times a day   - Out of bed for meals 2 times a day  - Out of bed for toileting  - Record patient progress and toleration of activity level   Outcome: Progressing     Problem: PAIN - ADULT  Goal: Verbalizes/displays adequate comfort level or baseline comfort level  Description: Interventions:  - Encourage patient to monitor pain and request assistance  - Assess pain using appropriate pain scale  - Administer analgesics based on type and severity of pain and evaluate response  - Implement non-pharmacological measures as appropriate and evaluate response  - Consider cultural and social influences on pain and pain management  - Notify physician/advanced practitioner if interventions unsuccessful or patient reports new pain  Outcome: Progressing     Problem: INFECTION - ADULT  Goal: Absence or prevention of progression during hospitalization  Description: INTERVENTIONS:  - Assess and monitor for signs and symptoms of infection  - Monitor lab/diagnostic results  - Monitor all insertion sites, i.e. indwelling lines, tubes, and drains  - Monitor endotracheal if appropriate and nasal secretions for changes in amount and color  - [place] appropriate cooling/warming therapies per order  - Administer medications as ordered  - Instruct and encourage patient and family to use good hand hygiene technique  - Identify and instruct in appropriate isolation precautions for identified infection/condition  Outcome: Progressing  Goal: Absence of fever/infection during neutropenic period  Description: INTERVENTIONS:  - Monitor WBC    Outcome: Progressing     Problem: SAFETY ADULT  Goal: Maintain or return to baseline ADL function  Description: INTERVENTIONS:  -  Assess patient's ability to carry out ADLs; assess patient's baseline for ADL function and identify physical deficits which impact ability to perform ADLs (bathing, care of mouth/teeth, toileting, grooming, dressing, etc.)  - Assess/evaluate cause of self-care deficits   - Assess range of motion  - Assess patient's mobility; develop plan if impaired  - Assess patient's need for assistive devices and provide as appropriate  - Encourage maximum independence but intervene and supervise when necessary  - Involve family in performance of ADLs  - Assess for home care needs following discharge   - Consider OT consult to assist with ADL evaluation and planning for discharge  - Provide patient education as appropriate  Outcome: Progressing  Goal: Maintains/Returns to pre admission functional level  Description: INTERVENTIONS:  - Perform BMAT or MOVE assessment daily.   - Set and communicate daily mobility goal to care team and patient/family/caregiver. - Collaborate with rehabilitation services on mobility goals if consulted  - Perform Range of Motion 4 times a day. - Reposition patient every 2 hours. - Dangle patient 2 times a day  - Stand patient 2 times a day  - Ambulate patient 2 times a day  - Out of bed to chair 2 times a day   - Out of bed for meals 2 times a day  - Out of bed for toileting  - Record patient progress and toleration of activity level   Outcome: Progressing  Goal: Patient will remain free of falls  Description: INTERVENTIONS:  - Educate patient/family on patient safety including physical limitations  - Instruct patient to call for assistance with activity   - Consult OT/PT to assist with strengthening/mobility   - Keep Call bell within reach  - Keep bed low and locked with side rails adjusted as appropriate  - Keep care items and personal belongings within reach  - Initiate and maintain comfort rounds  - Make Fall Risk Sign visible to staff  - Offer Toileting every 2 Hours, in advance of need  - Initiate/Maintain bed alarm  - Obtain necessary fall risk management equipment: call bell, side rails, bed alarm.   - Apply yellow socks and bracelet for high fall risk patients  - Consider moving patient to room near nurses station  Outcome: Progressing     Problem: DISCHARGE PLANNING  Goal: Discharge to home or other facility with appropriate resources  Description: INTERVENTIONS:  - Identify barriers to discharge w/patient and caregiver  - Arrange for needed discharge resources and transportation as appropriate  - Identify discharge learning needs (meds, wound care, etc.)  - Arrange for interpretive services to assist at discharge as needed  - Refer to Case Management Department for coordinating discharge planning if the patient needs post-hospital services based on physician/advanced practitioner order or complex needs related to functional status, cognitive ability, or social support system  Outcome: Progressing     Problem: Knowledge Deficit  Goal: Patient/family/caregiver demonstrates understanding of disease process, treatment plan, medications, and discharge instructions  Description: Complete learning assessment and assess knowledge base.   Interventions:  - Provide teaching at level of understanding  - Provide teaching via preferred learning methods  Outcome: Progressing     Problem: NEUROSENSORY - ADULT  Goal: Remains free of injury related to seizures activity  Description: INTERVENTIONS  - Maintain airway, patient safety  and administer oxygen as ordered  - Monitor patient for seizure activity, document and report duration and description of seizure to physician/advanced practitioner  - If seizure occurs,  ensure patient safety during seizure  - Reorient patient post seizure  - Seizure pads on all 4 side rails  - Instruct patient/family to notify RN of any seizure activity including if an aura is experienced  - Instruct patient/family to call for assistance with activity based on nursing assessment  - Administer anti-seizure medications if ordered    Outcome: Progressing     Problem: Prexisting or High Potential for Compromised Skin Integrity  Goal: Skin integrity is maintained or improved  Description: INTERVENTIONS:  - Identify patients at risk for skin breakdown  - Assess and monitor skin integrity  - Assess and monitor nutrition and hydration status  - Monitor labs   - Assess for incontinence   - Turn and reposition patient  - Assist with mobility/ambulation  - Relieve pressure over bony prominences  - Avoid friction and shearing  - Provide appropriate hygiene as needed including keeping skin clean and dry  - Evaluate need for skin moisturizer/barrier cream  - Collaborate with interdisciplinary team   - Patient/family teaching  - Consider wound care consult   Outcome: Progressing

## 2023-10-13 NOTE — PROGRESS NOTES
Progress Note - General Surgery  Мария Bagley 43 y.o. male MRN: [id number]  Unit/Bed#: ICU 03 Encounter: [id number]      Assessment:  43 y.o. male with necrotizing pancreatitis 2/2 alcohol abuse. Tachycardic, blood pressures stable. Febrile to 100.8    WBC 11.8 from 13.3  Hgb 13.5 from 15.4  Cr: 0.59 from 0.72  T Bili 1.59 from 1.33      Plan:  - Begin CLD today, make NPO if worsening abdominal pain. - Continue IVF  - CIWA protocol  - DVT ppx  - Encourage OOB and ambulation  - No indication for acute surgical intervention or drainage at this point in his course. Continue supportive care. - No role for Abx currently  - General surgery will continue to follow along        Subjective: No acute events overnight. Febrile to 100.8, tachycardia. No nausea, or vomiting, subjective fevers or chills. Abdominal pain slightly improved this morning. Remains tender on exam. Passing flatus, no BM. Objective:   Temp:  [99 °F (37.2 °C)-100.8 °F (38.2 °C)] 99.4 °F (37.4 °C)  HR:  [114-130] 124  Resp:  [18-26] 22  BP: (113-158)/(70-93) 143/81    Physical Exam:  General: No acute distress, alert and oriented,  CV: Well perfused, tachycardia  Lungs: Normal work of breathing, no increased respiratory effort  Abdomen: Soft, tender especially in epigastrium, distended, no rebound or guarding  Extremities: No edema, clubbing or cyanosis  Skin: Warm, dry      I/O         10/11 0701  10/12 0700 10/12 0701  10/13 0700 10/13 0701  10/14 0700    I.V. (mL/kg)  1626.5 (13.3)     IV Piggyback  2000     Total Intake(mL/kg)  3626.5 (29.7)     Urine (mL/kg/hr)  1465     Total Output  1465     Net  +2161.5                    Lab, Imaging and other studies: I have personally reviewed pertinent reports.   , CBC with diff:   Lab Results   Component Value Date    WBC 11.85 (H) 10/13/2023    HGB 13.5 10/13/2023    HCT 41.5 10/13/2023    MCV 95 10/13/2023     (L) 10/13/2023    RBC 4.35 10/13/2023    MCH 31.0 10/13/2023    MCHC 32.5 10/13/2023    RDW 14.4 10/13/2023    MPV 10.2 10/13/2023    NRBC 0 10/13/2023   , BMP/CMP:   Lab Results   Component Value Date    SODIUM 135 10/13/2023    K 3.4 (L) 10/13/2023     10/13/2023    CO2 27 10/13/2023    BUN 7 10/13/2023    CREATININE 0.59 (L) 10/13/2023    CALCIUM 7.9 (L) 10/13/2023    AST 16 10/13/2023    ALT 37 10/13/2023    ALKPHOS 37 10/13/2023    EGFR 127 10/13/2023         Destinee Templeton MD  10/13/2023 7:37 AM

## 2023-10-13 NOTE — ASSESSMENT & PLAN NOTE
Pt with a h/o chronic heavy alcohol use, Drinks 8 beers (16 oz) + 8 shots of whiskey daily. Last drink 10/9     Plan  CIWA with Serax 15mg PO Q8hrs.    Continue IVFs,   thiamine/folic acid supplementation  PPI prophylaxis   Continue to monitor CMP

## 2023-10-13 NOTE — UTILIZATION REVIEW
NOTIFICATION OF INPATIENT ADMISSION   AUTHORIZATION REQUEST   SERVICING FACILITY:   [address] [place] [address]  Tax ID: [id number]  NPI: [id number] ATTENDING PROVIDER:  Attending Name and NPI#: Anil Diamond Md [[id number]]  Address: [address]  Phone: [phone]     ADMISSION INFORMATION:  [facility]   Place of Service Code: 21  Inpatient Admission Date/Time: 10/12/23  1:36 PM  Discharge Date/Time: No discharge date for patient encounter. Admitting Diagnosis Code/Description:  Alcohol-induced acute pancreatitis without infection or necrosis     UTILIZATION REVIEW CONTACT:  [facility] Utilization   Network Utilization Review Department  Phone: [phone]  Fax [phone]  Email: [email or web address]  Contact for approvals/pending authorizations, clinical reviews, and discharge. PHYSICIAN ADVISORY SERVICES:  Medical Necessity Denial & Xjxm-zx-Zgnv Review  Phone: [phone]  Fax: [phone]  Email: [email or web address]     DISCHARGE SUPPORT TEAM:  For Patients Discharge Needs & Updates  Phone: [phone] opt. 2 Fax: [phone]  Email: [email or web address]

## 2023-10-13 NOTE — CASE MANAGEMENT
Case Management Assessment & Discharge Planning Note    Patient name Lashon Mccauley  Location ICU 03/ICU 91 MRN [id number]  : 1985 Date 10/13/2023       Current Admission Date: 10/12/2023  Current Admission Diagnosis:Alcohol-induced acute pancreatitis with infected necrosis   Patient Active Problem List    Diagnosis Date Noted    Thrombocytopenia ([address]) 10/12/2023    Tachycardia 10/12/2023    Alcohol-induced acute pancreatitis with infected necrosis     Alcohol use disorder, severe, dependence (HCC)     Elevated LFTs     Current smoker 2022    HTN, goal below 140/90     Umbilical hernia without obstruction and without gangrene 11/15/2022    Anxiety 11/15/2022    History of alcohol abuse 11/15/2022    Normocytic anemia 2021    Transaminitis 2021    SBO (small bowel obstruction) ([address]) 2021    Acute pancreatitis 2021      LOS (days): 1  Geometric Mean LOS (GMLOS) (days):   Days to GMLOS:     OBJECTIVE:    Risk of Unplanned Readmission Score: 7.31         Current admission status: Inpatient       Preferred Pharmacy:   [address] [place] [address] #[id number] Petrona Orf, [address] [address]  Phone: [phone] Fax: [phone]    Primary Care Provider: No primary care provider on file. Primary Insurance: [employer]  Secondary Insurance:     ASSESSMENT:  Active Health Care Proxies    There are no active Health Care Proxies on file.          Patient Information  Admitted from[de-identified] Facility [facility] Detox unit)  Mental Status: Alert  During Assessment patient was accompanied by: Parent  Assessment information provided by[de-identified] Patient  Primary Caregiver: Self  Support Systems: Parent  In the last 12 months, was there a time when you were not able to pay the mortgage or rent on time?: No  In the last 12 months, how many places have you lived?: 1  In the last 12 months, was there a time when you did not have a steady place to sleep or slept in a shelter (including now)?: No  Homeless/housing insecurity resource given?: N/A  Living Arrangements: Lives w/ Parent(s)    Activities of Daily Living Prior to Admission  Functional Status: Independent  Completes ADLs independently?: Yes  Ambulates independently?: Yes  Does patient use assisted devices?: No  Does patient currently own DME?: No  Does patient have a history of Outpatient Therapy (PT/OT)?: No  Does the patient have a history of Short-Term Rehab?: No  Does patient have a history of HHC?: No  Does patient currently have [address]?: No         Patient Information Continued  Income Source: Self-employed  Within the past 12 months, you worried that your food would run out before you got the money to buy more.: Never true  Within the past 12 months, the food you bought just didn't last and you didn't have money to get more.: Never true  Food insecurity resource given?: N/A  Does patient have a history of substance abuse?: Yes  Historical substance use preference: Alcohol/ETOH  History of Withdrawal Symptoms: Seizures, Other withdrawal symptoms (specify in comment)         Means of Transportation  Means of Transport to Appts[de-identified] Drives Self  In the past 12 months, has lack of transportation kept you from medical appointments or from getting medications?: No  In the past 12 months, has lack of transportation kept you from meetings, work, or from getting things needed for daily living?: No  Was application for public transport provided?: N/A        DISCHARGE DETAILS:    Discharge planning discussed with[de-identified] Patient  Freedom of Choice: Yes     CM contacted family/caregiver?: Yes (Mom in person, unlisted)  Were Treatment Team discharge recommendations reviewed with patient/caregiver?: Yes  Did patient/caregiver verbalize understanding of patient care needs?: Yes  Were patient/caregiver advised of the risks associated with not following Treatment Team discharge recommendations?: Yes       Additional Comments: Cm spoke with patient, he lives with his mom elias concepcion otherwise independent. Patient is agreeable to [facility] outpatient services once medically clear.

## 2023-10-13 NOTE — PROGRESS NOTES
[facility]  Progress Note  Name: Elvira Alexis  MRN: [id number]  Unit/Bed#: ICU 03 I Date of Admission: 10/12/2023   Date of Service: 10/13/2023 I Hospital Day: 1    Assessment/Plan   * Alcohol-induced acute pancreatitis with infected necrosis  Assessment & Plan  Patient with past medical history of ETOH abuse presented to [facility] ED with abdominal pain radiating to back for 2 days. Initial CT A/P showed "Peripancreatic inflammatory changes consistent with acute pancreatitis" w/o evidence of acute INFX necrosis, or pseudocyst formation. Patient transferred to [facility] for detox. 10/12 repeat CT showed new evidence of pancreatic necrosis, 3.6 x 3.1 cm, with worsening of extensive retroperitoneal fat stranding and edema. Clear Lq Diet make NPO if worsening abdo pain   Pain control- morphine IV PRN  Monitor for worsening signs or symptoms of necrosis    Daily CBC  Trend lactate: WNL   CIWA  Consult general surgery to evaluate          Alcohol use disorder, severe, dependence ([address])  Assessment & Plan  Pt with a h/o chronic heavy alcohol use, Drinks 8 beers (16 oz) + 8 shots of whiskey daily. Last drink 10/9     Plan  CIWA with Ativan PRN   Continue IVFs,   thiamine/folic acid supplementation  PPI prophylaxis   Continue to monitor CMP          Anxiety  Assessment & Plan  Historial.  Takes Hydroxyzine 25 mg at home. Continue at home hydroxyzine     Elevated LFTs  Assessment & Plan  AST and ALT elevated on admission, improving, bilirubin with slight uptrend. Likely 2/2 chronic alcohol use/alcoholic liver disease. CT A/P 10/10 showed hepatic steatosis without mass, abnormal contours, or biliary duct dilation     Monitor CMP  Encourage alcohol cessation             ICU Core Measures     A: Assess, Prevent, and Manage Pain Has pain been assessed? Yes  Need for changes to pain regimen?  No   B: Both SAT/SAT  N/A   C: Choice of Sedation RASS Goal: 0 Alert and Calm or N/A patient not on sedation  Need for changes to sedation or analgesia regimen? NA   D: Delirium CAM-ICU: Negative   E: Early Mobility  Plan for early mobility? Yes   F: Family Engagement Plan for family engagement today? NA         Prophylaxis:  VTE VTE covered by:  enoxaparin, Subcutaneous       Stress Ulcer  covered bypantoprazole (PROTONIX) injection 40 mg [[id number]]       Subjective   HPI/24hr events: Pt doing well physically. Pt is tearful and has depressed mood. Pt noted wanting code level to be changed from DNR/DNI to full code. Pt said he didn't want to make and rash decisions. Objective                            Vitals I/O      Most Recent Min/Max in 24hrs   Temp 99.4 °F (37.4 °C) Temp  Min: 99 °F (37.2 °C)  Max: 99.9 °F (37.7 °C)   Pulse (!) 124 Pulse  Min: 114  Max: 130   Resp 22 Resp  Min: 18  Max: 26   /81 BP  Min: 113/76  Max: 158/88   O2 Sat 94 % SpO2  Min: 89 %  Max: 95 %      Intake/Output Summary (Last 24 hours) at 10/13/2023 0830  Last data filed at 10/13/2023 9811  Gross per 24 hour   Intake 3626.5 ml   Output 1465 ml   Net 2161.5 ml         Diet NPO     Invasive Monitoring Physical exam    Physical Exam  Eyes:      Extraocular Movements: Extraocular movements intact. Conjunctiva/sclera: Conjunctivae normal.   Skin:     General: Skin is warm and dry. HENT:      Head: Normocephalic. Nose: No congestion. Mouth/Throat:      Mouth: Mucous membranes are dry. Neck:   no midline tenderness Cardiovascular:      Rate and Rhythm: Regular rhythm. Tachycardia present. Pulses: Normal pulses. Musculoskeletal:         General: Normal range of motion. Abdominal:      General: There is distension. Tenderness: There is abdominal tenderness. There is guarding. Comments: 5/10 abdo pain with associated tenderness in all quadrants. Improved from 9/10   Constitutional:       General: He is not in acute distress. Appearance: He is well-developed and well-nourished.  He is not toxic-appearing. Pulmonary:      Effort: Pulmonary effort is normal. No accessory muscle usage, respiratory distress or accessory muscle usage. Breath sounds: Normal breath sounds. No wheezing or rales. Neurological:      General: No focal deficit present. Mental Status: He is alert and oriented to person, place and time. He is not agitated. Motor: gross motor function is at baseline for patient. Comments: No tremulous noted when arms and hands extended. and Pt tearful and depressed             Diagnostic Studies      EKG: N/A  Imaging: I have personally reviewed pertinent reports.        Medications:  Scheduled PRN   chlorhexidine, 15 mL, Q12H PIETRO  enoxaparin, 40 mg, Daily  folic acid 1 mg, thiamine (VITAMIN B1) 100 mg in sodium chloride 0.9 % 100 mL IV piggyback, , Daily  LORazepam, 1 mg, Q8H  nicotine, 21 mg, Daily  pantoprazole, 40 mg, Q24H PIETRO      morphine injection, 6 mg, Q3H PRN  ondansetron, 4 mg, Q8H PRN       Continuous    lactated ringers, 125 mL/hr, Last Rate: 125 mL/hr (10/13/23 0239)         Labs:    CBC    Recent Labs     10/12/23  0327 10/13/23  0533   WBC 13.33* 11.85*   HGB 15.4 13.5   HCT 45.2 41.5   * 143*     BMP    Recent Labs     10/12/23  0327 10/13/23  0533   SODIUM 134* 135   K 3.6 3.4*    100   CO2 27 27   AGAP 6 8   BUN 8 7   CREATININE 0.72 0.59*   CALCIUM 8.1* 7.9*       Coags    No recent results     Additional Electrolytes  No recent results       Blood Gas    No recent results  No recent results LFTs  Recent Labs     10/12/23  0327 10/13/23  0533   ALT 59* 37   AST 23 16   ALKPHOS 35 37   ALB 3.3* 3.3*   TBILI 1.33* 1.59*       Infectious  Recent Labs     10/12/23  1014   PROCALCITONI 0.47*     Glucose  Recent Labs     10/12/23  0327 10/13/23  0533   GLUC 116 96               Bebo Bautista MD T[place] Counseling: I discussed with the patient the risks of guselkumab including but not limited to immunosuppression, serious infections, worsening of inflammatory bowel disease and drug reactions.  The patient understands that monitoring is required including a PPD at baseline and must alert us or the primary physician if symptoms of infection or other concerning signs are noted.

## 2023-10-13 NOTE — ASSESSMENT & PLAN NOTE
Patient with past medical history of ETOH abuse presented to [facility] ED with abdominal pain radiating to back for 2 days. Initial CT A/P showed "Peripancreatic inflammatory changes consistent with acute pancreatitis" w/o evidence of acute INFX necrosis, or pseudocyst formation. Patient transferred to [facility] for detox. 10/12 repeat CT showed new evidence of pancreatic necrosis, 3.6 x 3.1 cm, with worsening of extensive retroperitoneal fat stranding and edema. Clear Lq Diet make NPO if worsening abdo pain   Pain control- morphine IV PRN  Monitor for worsening signs or symptoms of necrosis    Daily CBC  Trend lactate:  WNL   LACHO  Consult general surgery to evaluate

## 2023-10-13 NOTE — UTILIZATION REVIEW
NOTIFICATION OF ADMISSION DISCHARGE   This is a Notification of Discharge from [address] [facility]. Please be advised that this patient has been discharge from our facility. Below you will find the admission and discharge date and time including the patient’s disposition. UTILIZATION REVIEW CONTACT:  Huber Rosario  Utilization   Network Utilization Review Department  Phone: [phone] x carefully listen to the prompts. All voicemails are confidential.  Email: [email or web address]     ADMISSION INFORMATION  PRESENTATION DATE: 10/10/2023 11:12 PM  Garland Mcgrath ADMISSION DATE:   INPATIENT ADMISSION DATE: 10/10/23 11:12 PM   DISCHARGE DATE: 10/12/2023  1:18 PM   DISPOSITION:[facility] Utilization Review Department  ATTENTION: Please call with any questions or concerns to [phone] and carefully listen to the prompts so that you are directed to the right person. All voicemails are confidential.   For Discharge needs, contact Care Management DC Support Team at [phone] opt. 2  Send all requests for admission clinical reviews, approved or denied determinations and any other requests to dedicated fax number below belonging to the campus where the patient is receiving treatment.  List of dedicated fax numbers for the Facilities:  [place] DENIALS (Administrative/Medical Necessity) [phone]   DISCHARGE SUPPORT TEAM (Network) [phone] [address] [facility] (Maternity/NICU/Pediatrics) [phone]   [address] [facility] [address] [place] [address] [address]  [place] [phone]

## 2023-10-14 LAB
ALBUMIN SERPL BCP-MCNC: 3.3 G/DL (ref 3.5–5)
ALP SERPL-CCNC: 43 U/L (ref 34–104)
ALT SERPL W P-5'-P-CCNC: 35 U/L (ref 7–52)
ANION GAP SERPL CALCULATED.3IONS-SCNC: 6 MMOL/L
AST SERPL W P-5'-P-CCNC: 33 U/L (ref 13–39)
BASOPHILS # BLD AUTO: 0.03 THOUSANDS/ÂΜL (ref 0–0.1)
BASOPHILS NFR BLD AUTO: 0 % (ref 0–1)
BILIRUB DIRECT SERPL-MCNC: 0.3 MG/DL (ref 0–0.2)
BILIRUB SERPL-MCNC: 1.73 MG/DL (ref 0.2–1)
BUN SERPL-MCNC: 5 MG/DL (ref 5–25)
CALCIUM SERPL-MCNC: 8.1 MG/DL (ref 8.4–10.2)
CHLORIDE SERPL-SCNC: 98 MMOL/L (ref 96–108)
CO2 SERPL-SCNC: 29 MMOL/L (ref 21–32)
CREAT SERPL-MCNC: 0.58 MG/DL (ref 0.6–1.3)
EOSINOPHIL # BLD AUTO: 0.1 THOUSAND/ÂΜL (ref 0–0.61)
EOSINOPHIL NFR BLD AUTO: 1 % (ref 0–6)
ERYTHROCYTE [DISTWIDTH] IN BLOOD BY AUTOMATED COUNT: 13.9 % (ref 11.6–15.1)
GFR SERPL CREATININE-BSD FRML MDRD: 128 ML/MIN/1.73SQ M
GLUCOSE SERPL-MCNC: 103 MG/DL (ref 65–140)
HCT VFR BLD AUTO: 36.6 % (ref 36.5–49.3)
HGB BLD-MCNC: 12.6 G/DL (ref 12–17)
IMM GRANULOCYTES # BLD AUTO: 0.06 THOUSAND/UL (ref 0–0.2)
IMM GRANULOCYTES NFR BLD AUTO: 1 % (ref 0–2)
LYMPHOCYTES # BLD AUTO: 1.15 THOUSANDS/ÂΜL (ref 0.6–4.47)
LYMPHOCYTES NFR BLD AUTO: 12 % (ref 14–44)
MCH RBC QN AUTO: 31.7 PG (ref 26.8–34.3)
MCHC RBC AUTO-ENTMCNC: 34.4 G/DL (ref 31.4–37.4)
MCV RBC AUTO: 92 FL (ref 82–98)
MONOCYTES # BLD AUTO: 1.09 THOUSAND/ÂΜL (ref 0.17–1.22)
MONOCYTES NFR BLD AUTO: 11 % (ref 4–12)
NEUTROPHILS # BLD AUTO: 7.09 THOUSANDS/ÂΜL (ref 1.85–7.62)
NEUTS SEG NFR BLD AUTO: 75 % (ref 43–75)
NRBC BLD AUTO-RTO: 0 /100 WBCS
PLATELET # BLD AUTO: 167 THOUSANDS/UL (ref 149–390)
PMV BLD AUTO: 10.6 FL (ref 8.9–12.7)
POTASSIUM SERPL-SCNC: 4.2 MMOL/L (ref 3.5–5.3)
PROCALCITONIN SERPL-MCNC: 0.44 NG/ML
PROT SERPL-MCNC: 6.9 G/DL (ref 6.4–8.4)
RBC # BLD AUTO: 3.97 MILLION/UL (ref 3.88–5.62)
SODIUM SERPL-SCNC: 133 MMOL/L (ref 135–147)
WBC # BLD AUTO: 9.52 THOUSAND/UL (ref 4.31–10.16)

## 2023-10-14 PROCEDURE — 85025 COMPLETE CBC W/AUTO DIFF WBC: CPT

## 2023-10-14 PROCEDURE — 84145 PROCALCITONIN (PCT): CPT

## 2023-10-14 PROCEDURE — C9113 INJ PANTOPRAZOLE SODIUM, VIA: HCPCS

## 2023-10-14 PROCEDURE — 99232 SBSQ HOSP IP/OBS MODERATE 35: CPT | Performed by: INTERNAL MEDICINE

## 2023-10-14 PROCEDURE — 80076 HEPATIC FUNCTION PANEL: CPT

## 2023-10-14 PROCEDURE — 80048 BASIC METABOLIC PNL TOTAL CA: CPT

## 2023-10-14 PROCEDURE — NC001 PR NO CHARGE: Performed by: SURGERY

## 2023-10-14 RX ORDER — PANTOPRAZOLE SODIUM 40 MG/1
40 TABLET, DELAYED RELEASE ORAL
Status: DISCONTINUED | OUTPATIENT
Start: 2023-10-15 | End: 2023-10-17 | Stop reason: HOSPADM

## 2023-10-14 RX ADMIN — OXAZEPAM 15 MG: 15 CAPSULE ORAL at 15:02

## 2023-10-14 RX ADMIN — Medication 21 MG: at 08:55

## 2023-10-14 RX ADMIN — CHLORHEXIDINE GLUCONATE 15 ML: 1.2 RINSE ORAL at 08:55

## 2023-10-14 RX ADMIN — MORPHINE SULFATE 6 MG: 2 INJECTION, SOLUTION INTRAMUSCULAR; INTRAVENOUS at 03:38

## 2023-10-14 RX ADMIN — OXAZEPAM 15 MG: 15 CAPSULE ORAL at 21:35

## 2023-10-14 RX ADMIN — OXAZEPAM 15 MG: 15 CAPSULE ORAL at 06:14

## 2023-10-14 RX ADMIN — FOLIC ACID: 5 INJECTION, SOLUTION INTRAMUSCULAR; INTRAVENOUS; SUBCUTANEOUS at 12:50

## 2023-10-14 RX ADMIN — MORPHINE SULFATE 6 MG: 2 INJECTION, SOLUTION INTRAMUSCULAR; INTRAVENOUS at 21:35

## 2023-10-14 RX ADMIN — MORPHINE SULFATE 6 MG: 2 INJECTION, SOLUTION INTRAMUSCULAR; INTRAVENOUS at 12:45

## 2023-10-14 RX ADMIN — MORPHINE SULFATE 6 MG: 2 INJECTION, SOLUTION INTRAMUSCULAR; INTRAVENOUS at 17:09

## 2023-10-14 RX ADMIN — MORPHINE SULFATE 6 MG: 2 INJECTION, SOLUTION INTRAMUSCULAR; INTRAVENOUS at 08:55

## 2023-10-14 RX ADMIN — ENOXAPARIN SODIUM 40 MG: 40 INJECTION SUBCUTANEOUS at 08:55

## 2023-10-14 RX ADMIN — PANTOPRAZOLE SODIUM 40 MG: 40 INJECTION, POWDER, FOR SOLUTION INTRAVENOUS at 08:55

## 2023-10-14 NOTE — PROGRESS NOTES
[address] [facility]  Progress Note  Name: Jordan Valdivia  MRN: [id number]  Unit/Bed#: E4 -01 I Date of Admission: 10/12/2023   Date of Service: 10/14/2023 I Hospital Day: 2    Assessment/Plan   Elevated LFTs  Assessment & Plan  Resolved    Alcohol use disorder, severe, dependence ([address])  Assessment & Plan  Patient requesting host referral  [facility] protocol  Thiamine and folate repletion  Taper Serax    * Alcohol-induced acute pancreatitis with infected necrosis  Assessment & Plan  Patient with past medical history of ETOH abuse presented to [facility] ED with abdominal pain radiating to back for 2 days. Initial CT A/P showed "Peripancreatic inflammatory changes consistent with acute pancreatitis" w/o evidence of acute INFX necrosis, or pseudocyst formation. Patient transferred to [address] [place] [address] for detox. 10/12 repeat CT showed new evidence of pancreatic necrosis, 3.6 x 3.1 cm, with worsening of extensive retroperitoneal fat stranding and edema. Clear Lq Diet make NPO if worsening abdo pain   Pain control- morphine IV PRN  Monitor for worsening signs or symptoms of necrosis    Daily CBC  Trend lactate: WNL   [facility]  Consult general surgery to evaluate              VTE Pharmacologic Prophylaxis:   Pharmacologic: Enoxaparin (Lovenox)  Mechanical VTE Prophylaxis in Place: Yes    Patient Centered Rounds: I have performed bedside rounds with nursing staff today. Discussions with Specialists or Other Care Team Provider:     Education and Discussions with Family / Patient: Care plan discussed with patient who voiced understanding and agrees with recommendations. Time Spent for Care: 45 minutes. More than 50% of total time spent on counseling and coordination of care as described above.     Current Length of Stay: 2 day(s)    Current Patient Status: Inpatient   Certification Statement: The patient will continue to require additional inpatient hospital stay due to treatment of pancreatitis    Discharge Plan: To be determined, likely 24 to 48 hours    Code Status: Level 1 - Full Code      Subjective:   Patient seen and examined bedside, no acute distress or discomfort noted. Clear liquid diet, labs and vitals stable. No surgical needs at this time. Monitor overnight, taper Serax, and anticipate discharge in 24 to 48 hours. Objective:     Vitals:   Temp (24hrs), Av.9 °F (37.2 °C), Min:98.1 °F (36.7 °C), Max:99.7 °F (37.6 °C)    Temp:  [98.1 °F (36.7 °C)-99.7 °F (37.6 °C)] 99.4 °F (37.4 °C)  HR:  [112-126] 115  Resp:  [18-20] 20  BP: (139-168)/() 150/88  SpO2:  [91 %-96 %] 91 %  Body mass index is 36.24 kg/m². Input and Output Summary (last 24 hours): Intake/Output Summary (Last 24 hours) at 10/14/2023 1622  Last data filed at 10/14/2023 0800  Gross per 24 hour   Intake 480 ml   Output 400 ml   Net 80 ml       Physical Exam:     Physical Exam  Vitals and nursing note reviewed. Constitutional:       General: He is not in acute distress. Appearance: He is well-developed. HENT:      Head: Normocephalic and atraumatic. Eyes:      Conjunctiva/sclera: Conjunctivae normal.   Cardiovascular:      Rate and Rhythm: Normal rate. Heart sounds: No murmur heard. Pulmonary:      Effort: Pulmonary effort is normal. No respiratory distress. Abdominal:      Palpations: Abdomen is soft. Tenderness: There is no abdominal tenderness. Musculoskeletal:         General: No swelling. Cervical back: Neck supple. Skin:     General: Skin is warm and dry. Neurological:      Mental Status: He is alert.    Psychiatric:         Mood and Affect: Mood normal.           Additional Data:     Labs:    Results from last 7 days   Lab Units 10/14/23  0634   WBC Thousand/uL 9.52   HEMOGLOBIN g/dL 12.6   HEMATOCRIT % 36.6   PLATELETS Thousands/uL 167   NEUTROS PCT % 75   LYMPHS PCT % 12*   MONOS PCT % 11   EOS PCT % 1     Results from last 7 days   Lab Units 10/14/23  0705 10/14/23  0618   SODIUM mmol/L 133*  --    POTASSIUM mmol/L 4.2  --    CHLORIDE mmol/L 98  --    CO2 mmol/L 29  --    BUN mg/dL 5  --    CREATININE mg/dL 0.58*  --    ANION GAP mmol/L 6  --    CALCIUM mg/dL 8.1*  --    ALBUMIN g/dL  --  3.3*   TOTAL BILIRUBIN mg/dL  --  1.73*   ALK PHOS U/L  --  43   ALT U/L  --  35   AST U/L  --  33   GLUCOSE RANDOM mg/dL 103  --      Results from last 7 days   Lab Units 10/10/23  1642   INR  0.92             Results from last 7 days   Lab Units 10/14/23  0618 10/12/23  1014 10/10/23  1842 10/10/23  1642   LACTIC ACID mmol/L  --  0.7 1.3 2.3*   PROCALCITONIN ng/ml 0.44* 0.47*  --   --            * I Have Reviewed All Lab Data Listed Above. * Additional Pertinent Lab Tests Reviewed: All Labs Within Last 24 Hours Reviewed    Imaging:    Imaging Reports Reviewed Today Include: CT chest abdomen pelvis  Imaging Personally Reviewed by Myself Includes:      Recent Cultures (last 7 days):     Results from last 7 days   Lab Units 10/12/23  1013   BLOOD CULTURE  No Growth at 24 hrs. No Growth at 24 hrs. Last 24 Hours Medication List:   Current Facility-Administered Medications   Medication Dose Route Frequency Provider Last Rate    enoxaparin  40 mg Subcutaneous Daily Lily Romero MD      [START ON 96/75/4659] folic acid 1 mg, thiamine (VITAMIN B1) 100 mg in sodium chloride 0.9 % 100 mL IV piggyback   Intravenous Daily Lily Romero MD      morphine injection  6 mg Intravenous Q3H PRN Lily Romero MD      nicotine  21 mg Transdermal Daily Lily Romero MD      ondansetron  4 mg Intravenous Q8H PRN Lily Romero MD      oxazepam  15 mg Oral [facility] Lily Romero MD      [START ON 10/15/2023] pantoprazole  40 mg Oral Early Morning Ángel Mittal MD          Today, Patient Was Seen By: Kendra Edgar MD    ** Please Note: Dictation voice to text software may have been used in the creation of this document.  **

## 2023-10-14 NOTE — RESTORATIVE TECHNICIAN NOTE
Restorative Technician Note      Patient Name: Allison Lira     [facility] Tech Visit Date: 10/14/23  Note Type: Mobility  Patient Position Upon Consult: Supine  Activity Performed: Transferred; Ambulated  Patient Position at End of Consult: Bedside chair;  All needs within reach

## 2023-10-14 NOTE — ASSESSMENT & PLAN NOTE
Patient with past medical history of ETOH abuse presented to [facility] ED with abdominal pain radiating to back for 2 days. Initial CT A/P showed "Peripancreatic inflammatory changes consistent with acute pancreatitis" w/o evidence of acute INFX necrosis, or pseudocyst formation. Patient transferred to [facility] for detox. 10/12 repeat CT showed new evidence of pancreatic necrosis, 3.6 x 3.1 cm, with worsening of extensive retroperitoneal fat stranding and edema. Clear Lq Diet make NPO if worsening abdo pain   Pain control- morphine IV PRN  Monitor for worsening signs or symptoms of necrosis    Daily CBC  Trend lactate:  WNL   [facility]ACHO  Consult general surgery to evaluate

## 2023-10-14 NOTE — PROGRESS NOTES
Progress Note -General surgery  : General surgery Resident on [facility]     Orysia Dance 43 y.o. male MRN: [id number]  Unit/Bed#: E4 -01 Encounter: [id number]    Assessment:  43 y.o. male with necrotizing pancreatitis secondary to alcohol abuse. Patient with persistent tachycardia, transferred out of the ICU 10/13    Persistently tachycardic to the 120s, now downtrending to the 110s, otherwise vital signs stable within normal limits on 3 L nasal cannula saturating in the mid 90s    Urine output 1100 cc    WBC WBC 9.5 from 11.9  Hemoglobin 12.6 from 13.5  Creatinine creatinine 1.58 from 0.59    Plan:  Continue diet as tolerated  Monitor vital signs/abdominal exam  Follow-up liver function testing  Continue [facility] protocol  Encourage ambulation/out of bed, 3 times daily  Continue to monitor off of antibiotics at this time  Anticoagulation Plan-Lovenox  Disposition Plan -pending  Rest of care per primary team    Subjective/Objective     Subjective: No acute events overnight. Patient was persistently tachycardic but did well overall. Patient tolerated clear liquid diet without nausea and vomiting. Patient is urinating and having bowel movements. Patient is ambulating to the bathroom. Patient denies fevers chills and shortness of breath and slept well overnight. Objective:     Physical Exam:  GEN: NAD  HEENT: MMM  CV: Well-perfused regular rate  Lung: Normal effort on 3 L nasal cannula, SPO2 mid to low 90s  Ab: Protuberant, nontender, soft  Extrem: No lower extremity edema bilaterally  Neuro: Alert and oriented x3    Vitals: Temp:  [98.1 °F (36.7 °C)-99.7 °F (37.6 °C)] 99 °F (37.2 °C)  HR:  [112-134] 115  Resp:  [18-26] 20  BP: (126-168)/() 150/92  Body mass index is 36.24 kg/m². I/O         10/12 0701  10/13 0700 10/13 0701  10/14 0700 10/14 0701  10/15 0700    P. O.  120     I.V. (mL/kg) 1626.5 (13.3) 752.1 (6.2)     IV Piggyback 2000      Total Intake(mL/kg) 3626.5 (29.7) 872.1 (7.2)     Urine (mL/kg/hr) 1465 1100 (0.4)     Total Output 1465 1100     Net +2161.5 -227.9                      Lab, Imaging and other studies: I have personally reviewed pertinent reports.   , CBC with diff:   Lab Results   Component Value Date    WBC 9.52 10/14/2023    HGB 12.6 10/14/2023    HCT 36.6 10/14/2023    MCV 92 10/14/2023     10/14/2023    RBC 3.97 10/14/2023    MCH 31.7 10/14/2023    MCHC 34.4 10/14/2023    RDW 13.9 10/14/2023    MPV 10.6 10/14/2023    NRBC 0 10/14/2023   , BMP/CMP:   Lab Results   Component Value Date    SODIUM 133 (L) 10/14/2023    K 4.2 10/14/2023    CL 98 10/14/2023    CO2 29 10/14/2023    BUN 5 10/14/2023    CREATININE 0.58 (L) 10/14/2023    CALCIUM 8.1 (L) 10/14/2023    EGFR 128 10/14/2023     VTE Pharmacologic Prophylaxis: Enoxaparin (Lovenox)  VTE Mechanical Prophylaxis: sequential compression device    Ibeth Garcia MD  10/14/2023 9:30 AM

## 2023-10-14 NOTE — ASSESSMENT & PLAN NOTE
Patient requesting host referral  [facility] protocol  Thiamine and folate repletion  Taper Serax

## 2023-10-14 NOTE — PROGRESS NOTES
The pantoprazole has been converted to Oral per [facility] IV-to-PO Auto-Conversion Protocol for Adults as approved by the Pharmacy and Therapeutics Committee. The patient met all eligible criteria:  3 Age = 25years old   2) Received at least one dose of the IV form   3) Receiving at least one other scheduled oral/enteral medication   4) Tolerating an oral/enteral diet   and did not have any exclusions:   1) Critical care patient   2) Active GI bleed (IF assessing H2RAs or PPIs)   3) Continuous tube feeding (IF assessing cipro, doxycycline, levofloxacin, minocycline, rifampin, or voriconazole)   4) Receiving PO vancomycin (IF assessing metronidazole)   5) Persistent nausea and/or vomiting   6) Ileus or gastrointestinal obstruction   7) Dinah/nasogastric tube set for continuous suction   8) Specific order not to automatically convert to PO (in the order's comments or if discussed in the most recent Infectious Disease or primary team's progress notes).      Mary Dominique, PharmD, [address] [place] [address] and Internal Medicine Clinical Pharmacist  [phone] or via [facility] [place]

## 2023-10-14 NOTE — PLAN OF CARE
Problem: MOBILITY - ADULT  Goal: Maintain or return to baseline ADL function  Description: INTERVENTIONS:  -  Assess patient's ability to carry out ADLs; assess patient's baseline for ADL function and identify physical deficits which impact ability to perform ADLs (bathing, care of mouth/teeth, toileting, grooming, dressing, etc.)  - Assess/evaluate cause of self-care deficits   - Assess range of motion  - Assess patient's mobility; develop plan if impaired  - Assess patient's need for assistive devices and provide as appropriate  - Encourage maximum independence but intervene and supervise when necessary  - Involve family in performance of ADLs  - Assess for home care needs following discharge   - Consider OT consult to assist with ADL evaluation and planning for discharge  - Provide patient education as appropriate  Outcome: Progressing  Goal: Maintains/Returns to pre admission functional level  Description: INTERVENTIONS:  - Perform BMAT or MOVE assessment daily.   - Set and communicate daily mobility goal to care team and patient/family/caregiver.    - Collaborate with rehabilitation services on mobility goals if consulted  - Out of bed for toileting  - Record patient progress and toleration of activity level   Outcome: Progressing     Problem: PAIN - ADULT  Goal: Verbalizes/displays adequate comfort level or baseline comfort level  Description: Interventions:  - Encourage patient to monitor pain and request assistance  - Assess pain using appropriate pain scale  - Administer analgesics based on type and severity of pain and evaluate response  - Implement non-pharmacological measures as appropriate and evaluate response  - Consider cultural and social influences on pain and pain management  - Notify physician/advanced practitioner if interventions unsuccessful or patient reports new pain  Outcome: Progressing     Problem: INFECTION - ADULT  Goal: Absence or prevention of progression during hospitalization  Description: INTERVENTIONS:  - Assess and monitor for signs and symptoms of infection  - Monitor lab/diagnostic results  - Monitor all insertion sites, i.e. indwelling lines, tubes, and drains  - Monitor endotracheal if appropriate and nasal secretions for changes in amount and color  - [place] appropriate cooling/warming therapies per order  - Administer medications as ordered  - Instruct and encourage patient and family to use good hand hygiene technique  - Identify and instruct in appropriate isolation precautions for identified infection/condition  Outcome: Progressing  Goal: Absence of fever/infection during neutropenic period  Description: INTERVENTIONS:  - Monitor WBC    Outcome: Progressing     Problem: SAFETY ADULT  Goal: Maintain or return to baseline ADL function  Description: INTERVENTIONS:  -  Assess patient's ability to carry out ADLs; assess patient's baseline for ADL function and identify physical deficits which impact ability to perform ADLs (bathing, care of mouth/teeth, toileting, grooming, dressing, etc.)  - Assess/evaluate cause of self-care deficits   - Assess range of motion  - Assess patient's mobility; develop plan if impaired  - Assess patient's need for assistive devices and provide as appropriate  - Encourage maximum independence but intervene and supervise when necessary  - Involve family in performance of ADLs  - Assess for home care needs following discharge   - Consider OT consult to assist with ADL evaluation and planning for discharge  - Provide patient education as appropriate  Outcome: Progressing  Goal: Maintains/Returns to pre admission functional level  Description: INTERVENTIONS:  - Perform BMAT or MOVE assessment daily.   - Set and communicate daily mobility goal to care team and patient/family/caregiver.    - Collaborate with rehabilitation services on mobility goals if consulted  - Out of bed for toileting  - Record patient progress and toleration of activity level   Outcome: Progressing  Goal: Patient will remain free of falls  Description: INTERVENTIONS:  - Educate patient/family on patient safety including physical limitations  - Instruct patient to call for assistance with activity   - Consult OT/PT to assist with strengthening/mobility   - Keep Call bell within reach  - Keep bed low and locked with side rails adjusted as appropriate  - Keep care items and personal belongings within reach  - Initiate and maintain comfort rounds  - Make Fall Risk Sign visible to staff  - Apply yellow socks and bracelet for high fall risk patients  - Consider moving patient to room near nurses station  Outcome: Progressing     Problem: DISCHARGE PLANNING  Goal: Discharge to home or other facility with appropriate resources  Description: INTERVENTIONS:  - Identify barriers to discharge w/patient and caregiver  - Arrange for needed discharge resources and transportation as appropriate  - Identify discharge learning needs (meds, wound care, etc.)  - Arrange for interpretive services to assist at discharge as needed  - Refer to Case Management Department for coordinating discharge planning if the patient needs post-hospital services based on physician/advanced practitioner order or complex needs related to functional status, cognitive ability, or social support system  Outcome: Progressing     Problem: Knowledge Deficit  Goal: Patient/family/caregiver demonstrates understanding of disease process, treatment plan, medications, and discharge instructions  Description: Complete learning assessment and assess knowledge base.   Interventions:  - Provide teaching at level of understanding  - Provide teaching via preferred learning methods  Outcome: Progressing     Problem: NEUROSENSORY - ADULT  Goal: Remains free of injury related to seizures activity  Description: INTERVENTIONS  - Maintain airway, patient safety  and administer oxygen as ordered  - Monitor patient for seizure activity, document and report duration and description of seizure to physician/advanced practitioner  - If seizure occurs,  ensure patient safety during seizure  - Reorient patient post seizure  - Seizure pads on all 4 side rails  - Instruct patient/family to notify RN of any seizure activity including if an aura is experienced  - Instruct patient/family to call for assistance with activity based on nursing assessment  - Administer anti-seizure medications if ordered    Outcome: Progressing     Problem: Prexisting or High Potential for Compromised Skin Integrity  Goal: Skin integrity is maintained or improved  Description: INTERVENTIONS:  - Identify patients at risk for skin breakdown  - Assess and monitor skin integrity  - Assess and monitor nutrition and hydration status  - Monitor labs   - Assess for incontinence   - Turn and reposition patient  - Assist with mobility/ambulation  - Relieve pressure over bony prominences  - Avoid friction and shearing  - Provide appropriate hygiene as needed including keeping skin clean and dry  - Evaluate need for skin moisturizer/barrier cream  - Collaborate with interdisciplinary team   - Patient/family teaching  - Consider wound care consult   Outcome: Progressing

## 2023-10-15 LAB
ALBUMIN SERPL BCP-MCNC: 3.3 G/DL (ref 3.5–5)
ALP SERPL-CCNC: 43 U/L (ref 34–104)
ALT SERPL W P-5'-P-CCNC: 31 U/L (ref 7–52)
ANION GAP SERPL CALCULATED.3IONS-SCNC: 8 MMOL/L
AST SERPL W P-5'-P-CCNC: 20 U/L (ref 13–39)
BACTERIA BLD CULT: NORMAL
BACTERIA BLD CULT: NORMAL
BASOPHILS # BLD AUTO: 0.03 THOUSANDS/ÂΜL (ref 0–0.1)
BASOPHILS NFR BLD AUTO: 0 % (ref 0–1)
BILIRUB SERPL-MCNC: 0.98 MG/DL (ref 0.2–1)
BUN SERPL-MCNC: 6 MG/DL (ref 5–25)
CALCIUM ALBUM COR SERPL-MCNC: 9.1 MG/DL (ref 8.3–10.1)
CALCIUM SERPL-MCNC: 8.5 MG/DL (ref 8.4–10.2)
CHLORIDE SERPL-SCNC: 98 MMOL/L (ref 96–108)
CO2 SERPL-SCNC: 31 MMOL/L (ref 21–32)
CREAT SERPL-MCNC: 0.61 MG/DL (ref 0.6–1.3)
EOSINOPHIL # BLD AUTO: 0.14 THOUSAND/ÂΜL (ref 0–0.61)
EOSINOPHIL NFR BLD AUTO: 2 % (ref 0–6)
ERYTHROCYTE [DISTWIDTH] IN BLOOD BY AUTOMATED COUNT: 13.8 % (ref 11.6–15.1)
GFR SERPL CREATININE-BSD FRML MDRD: 125 ML/MIN/1.73SQ M
GLUCOSE SERPL-MCNC: 88 MG/DL (ref 65–140)
HCT VFR BLD AUTO: 37.8 % (ref 36.5–49.3)
HGB BLD-MCNC: 12.2 G/DL (ref 12–17)
IMM GRANULOCYTES # BLD AUTO: 0.07 THOUSAND/UL (ref 0–0.2)
IMM GRANULOCYTES NFR BLD AUTO: 1 % (ref 0–2)
LYMPHOCYTES # BLD AUTO: 1.36 THOUSANDS/ÂΜL (ref 0.6–4.47)
LYMPHOCYTES NFR BLD AUTO: 18 % (ref 14–44)
MAGNESIUM SERPL-MCNC: 2.4 MG/DL (ref 1.9–2.7)
MCH RBC QN AUTO: 30.5 PG (ref 26.8–34.3)
MCHC RBC AUTO-ENTMCNC: 32.3 G/DL (ref 31.4–37.4)
MCV RBC AUTO: 95 FL (ref 82–98)
MONOCYTES # BLD AUTO: 0.88 THOUSAND/ÂΜL (ref 0.17–1.22)
MONOCYTES NFR BLD AUTO: 11 % (ref 4–12)
NEUTROPHILS # BLD AUTO: 5.31 THOUSANDS/ÂΜL (ref 1.85–7.62)
NEUTS SEG NFR BLD AUTO: 68 % (ref 43–75)
NRBC BLD AUTO-RTO: 0 /100 WBCS
PHOSPHATE SERPL-MCNC: 3.1 MG/DL (ref 2.7–4.5)
PLATELET # BLD AUTO: 192 THOUSANDS/UL (ref 149–390)
PMV BLD AUTO: 10.5 FL (ref 8.9–12.7)
POTASSIUM SERPL-SCNC: 3.3 MMOL/L (ref 3.5–5.3)
PROT SERPL-MCNC: 6.8 G/DL (ref 6.4–8.4)
RBC # BLD AUTO: 4 MILLION/UL (ref 3.88–5.62)
SODIUM SERPL-SCNC: 137 MMOL/L (ref 135–147)
WBC # BLD AUTO: 7.79 THOUSAND/UL (ref 4.31–10.16)

## 2023-10-15 PROCEDURE — 99232 SBSQ HOSP IP/OBS MODERATE 35: CPT | Performed by: SURGERY

## 2023-10-15 PROCEDURE — 84100 ASSAY OF PHOSPHORUS: CPT | Performed by: INTERNAL MEDICINE

## 2023-10-15 PROCEDURE — 85025 COMPLETE CBC W/AUTO DIFF WBC: CPT | Performed by: INTERNAL MEDICINE

## 2023-10-15 PROCEDURE — 99232 SBSQ HOSP IP/OBS MODERATE 35: CPT | Performed by: INTERNAL MEDICINE

## 2023-10-15 PROCEDURE — 93005 ELECTROCARDIOGRAM TRACING: CPT

## 2023-10-15 PROCEDURE — 80053 COMPREHEN METABOLIC PANEL: CPT | Performed by: INTERNAL MEDICINE

## 2023-10-15 PROCEDURE — 83735 ASSAY OF MAGNESIUM: CPT | Performed by: INTERNAL MEDICINE

## 2023-10-15 RX ORDER — OXAZEPAM 10 MG
10 CAPSULE ORAL EVERY 8 HOURS SCHEDULED
Status: DISCONTINUED | OUTPATIENT
Start: 2023-10-15 | End: 2023-10-17 | Stop reason: HOSPADM

## 2023-10-15 RX ORDER — MORPHINE SULFATE 4 MG/ML
4 INJECTION, SOLUTION INTRAMUSCULAR; INTRAVENOUS
Status: DISCONTINUED | OUTPATIENT
Start: 2023-10-15 | End: 2023-10-16

## 2023-10-15 RX ORDER — POTASSIUM CHLORIDE 20 MEQ/1
40 TABLET, EXTENDED RELEASE ORAL
Status: COMPLETED | OUTPATIENT
Start: 2023-10-15 | End: 2023-10-15

## 2023-10-15 RX ORDER — SODIUM CHLORIDE 9 MG/ML
125 INJECTION, SOLUTION INTRAVENOUS CONTINUOUS
Status: DISCONTINUED | OUTPATIENT
Start: 2023-10-15 | End: 2023-10-16

## 2023-10-15 RX ADMIN — MORPHINE SULFATE 4 MG: 4 INJECTION INTRAVENOUS at 13:52

## 2023-10-15 RX ADMIN — OXAZEPAM 15 MG: 15 CAPSULE ORAL at 05:34

## 2023-10-15 RX ADMIN — MORPHINE SULFATE 6 MG: 2 INJECTION, SOLUTION INTRAMUSCULAR; INTRAVENOUS at 05:35

## 2023-10-15 RX ADMIN — MORPHINE SULFATE 6 MG: 2 INJECTION, SOLUTION INTRAMUSCULAR; INTRAVENOUS at 01:47

## 2023-10-15 RX ADMIN — MORPHINE SULFATE 4 MG: 4 INJECTION INTRAVENOUS at 17:30

## 2023-10-15 RX ADMIN — Medication 21 MG: at 08:29

## 2023-10-15 RX ADMIN — MORPHINE SULFATE 4 MG: 4 INJECTION INTRAVENOUS at 10:23

## 2023-10-15 RX ADMIN — MORPHINE SULFATE 4 MG: 4 INJECTION INTRAVENOUS at 21:02

## 2023-10-15 RX ADMIN — FOLIC ACID: 5 INJECTION, SOLUTION INTRAMUSCULAR; INTRAVENOUS; SUBCUTANEOUS at 10:23

## 2023-10-15 RX ADMIN — ENOXAPARIN SODIUM 40 MG: 40 INJECTION SUBCUTANEOUS at 08:29

## 2023-10-15 RX ADMIN — OXAZEPAM 10 MG: 10 CAPSULE, GELATIN COATED ORAL at 14:16

## 2023-10-15 RX ADMIN — OXAZEPAM 10 MG: 10 CAPSULE, GELATIN COATED ORAL at 21:02

## 2023-10-15 RX ADMIN — SODIUM CHLORIDE 125 ML/HR: 0.9 INJECTION, SOLUTION INTRAVENOUS at 16:06

## 2023-10-15 RX ADMIN — POTASSIUM CHLORIDE 40 MEQ: 1500 TABLET, EXTENDED RELEASE ORAL at 12:08

## 2023-10-15 RX ADMIN — PANTOPRAZOLE SODIUM 40 MG: 40 TABLET, DELAYED RELEASE ORAL at 05:34

## 2023-10-15 RX ADMIN — POTASSIUM CHLORIDE 40 MEQ: 1500 TABLET, EXTENDED RELEASE ORAL at 08:29

## 2023-10-15 NOTE — PROGRESS NOTES
[facility]  Progress Note  Name: Salazar Kruger  MRN: [id number]  Unit/Bed#: E4 -01 I Date of Admission: 10/12/2023   Date of Service: 10/15/2023 I Hospital Day: 3    Assessment/Plan   * Alcohol-induced acute pancreatitis with infected necrosis  Assessment & Plan  Patient with past medical history of ETOH abuse presented to [facility] ED with abdominal pain radiating to back for 2 days. Initial CT A/P showed "Peripancreatic inflammatory changes consistent with acute pancreatitis" w/o evidence of acute INFX necrosis, or pseudocyst formation. Patient transferred to [facility] for detox. 10/12 repeat CT showed new evidence of pancreatic necrosis, 3.6 x 3.1 cm, with worsening of extensive retroperitoneal fat stranding and edema. Advance to Full Lq Diet; make NPO if worsening abdo pain   Pain control- morphine IV PRN; begin tapering down  Monitor for worsening signs or symptoms of necrosis    Daily CBC  Trend lactate:  WNL   CIWA  Consult general surgery to evaluate     Tachycardia  Assessment & Plan  Patient noted to be tachycardic through most of this admission, echo obtained shows normal heart function  Chart shows patient is positive for liters of fluid; but bicarb noted to be elevated from earlier labs  Some concern for contraction alkalosis in the setting of pancreatitis and reduced p.o. intake  Will reinitiate IV fluids; EKG pending  No history of arrhythmia; rule out rapid ventricular response  Alternatively, may be secondary to IVVD versus acute illness with pancreatitis versus EtOH withdrawal      Elevated LFTs  Assessment & Plan  Resolved    Alcohol use disorder, severe, dependence ([address])  Assessment & Plan  Patient requesting host referral  [facility] protocol  Thiamine and folate repletion  Taper Serax             VTE Pharmacologic Prophylaxis:   Pharmacologic: Enoxaparin (Lovenox)  Mechanical VTE Prophylaxis in Place: Yes    Patient Centered Rounds: I have performed bedside rounds with nursing staff today. Discussions with Specialists or Other Care Team Provider:     Education and Discussions with Family / Patient: Care plan discussed with patient who voiced understanding and agrees with recommendations. Time Spent for Care: 45 minutes. More than 50% of total time spent on counseling and coordination of care as described above. Current Length of Stay: 3 day(s)    Current Patient Status: Inpatient   Certification Statement: The patient will continue to require additional inpatient hospital stay due to treatment of pancreatitis    Discharge Plan: To be determined, likely 24 to 48 hours    Code Status: Level 1 - Full Code      Subjective:   Patient seen and examined bedside, no acute distress or discomfort noted. Have advanced patient's diet to full liquids with toast and crackers. Appears to be tolerating well. Patient still tachycardic for unclear reasons, will reinitiate IV fluids and encourage patient to drink plenty of water. Otherwise labs and vitals stable; EKG pending; potassium repleted. Objective:     Vitals:   Temp (24hrs), Av.5 °F (36.9 °C), Min:97.5 °F (36.4 °C), Max:99.4 °F (37.4 °C)    Temp:  [97.5 °F (36.4 °C)-99.4 °F (37.4 °C)] 98.5 °F (36.9 °C)  HR:  [] 112  Resp:  [18-20] 18  BP: (114-150)/(84-90) 148/90  SpO2:  [91 %-92 %] 91 %  Body mass index is 35.4 kg/m². Input and Output Summary (last 24 hours): Intake/Output Summary (Last 24 hours) at 10/15/2023 1442  Last data filed at 10/14/2023 2301  Gross per 24 hour   Intake 1681.42 ml   Output --   Net 1681.42 ml       Physical Exam:     Physical Exam  Vitals and nursing note reviewed. Constitutional:       General: He is not in acute distress. Appearance: He is well-developed. HENT:      Head: Normocephalic and atraumatic. Eyes:      Conjunctiva/sclera: Conjunctivae normal.   Cardiovascular:      Rate and Rhythm: Normal rate. Heart sounds: No murmur heard.   Pulmonary: Effort: Pulmonary effort is normal. No respiratory distress. Abdominal:      Palpations: Abdomen is soft. Tenderness: There is no abdominal tenderness. Musculoskeletal:         General: No swelling. Cervical back: Neck supple. Skin:     General: Skin is warm and dry. Neurological:      Mental Status: He is alert. Psychiatric:         Mood and Affect: Mood normal.           Additional Data:     Labs:    Results from last 7 days   Lab Units 10/15/23  0532   WBC Thousand/uL 7.79   HEMOGLOBIN g/dL 12.2   HEMATOCRIT % 37.8   PLATELETS Thousands/uL 192   NEUTROS PCT % 68   LYMPHS PCT % 18   MONOS PCT % 11   EOS PCT % 2     Results from last 7 days   Lab Units 10/15/23  0532   SODIUM mmol/L 137   POTASSIUM mmol/L 3.3*   CHLORIDE mmol/L 98   CO2 mmol/L 31   BUN mg/dL 6   CREATININE mg/dL 0.61   ANION GAP mmol/L 8   CALCIUM mg/dL 8.5   ALBUMIN g/dL 3.3*   TOTAL BILIRUBIN mg/dL 0.98   ALK PHOS U/L 43   ALT U/L 31   AST U/L 20   GLUCOSE RANDOM mg/dL 88     Results from last 7 days   Lab Units 10/10/23  1642   INR  0.92             Results from last 7 days   Lab Units 10/14/23  0618 10/12/23  1014 10/10/23  1842 10/10/23  1642   LACTIC ACID mmol/L  --  0.7 1.3 2.3*   PROCALCITONIN ng/ml 0.44* 0.47*  --   --            * I Have Reviewed All Lab Data Listed Above. * Additional Pertinent Lab Tests Reviewed: All Labs Within Last 24 Hours Reviewed    Imaging:    Imaging Reports Reviewed Today Include: Echocardiogram  Imaging Personally Reviewed by Myself Includes:      Recent Cultures (last 7 days):     Results from last 7 days   Lab Units 10/12/23  1013   BLOOD CULTURE  No Growth at 48 hrs. No Growth at 48 hrs.        Last 24 Hours Medication List:   Current Facility-Administered Medications   Medication Dose Route Frequency Provider Last Rate    enoxaparin  40 mg Subcutaneous Daily Minor Crews MD      morphine injection  4 mg Intravenous Q3H PRN Nelson Khan MD      nicotine  21 mg Transdermal Daily Naina Granado MD      ondansetron  4 mg Intravenous Q8H PRN Naina Granado MD      oxazepam  10 mg Oral [facility] Kelsy Chapman MD      pantoprazole  40 mg Oral Early Morning Kelsy Chapman MD          Today, Patient Was Seen By: Michael Kay MD    ** Please Note: Dictation voice to text software may have been used in the creation of this document.  **

## 2023-10-15 NOTE — ASSESSMENT & PLAN NOTE
Patient noted to be tachycardic through most of this admission, echo obtained shows normal heart function  Chart shows patient is positive for liters of fluid; but bicarb noted to be elevated from earlier labs  Some concern for contraction alkalosis in the setting of pancreatitis and reduced p.o. intake  Will reinitiate IV fluids; EKG pending  No history of arrhythmia; rule out rapid ventricular response  Alternatively, may be secondary to IVVD versus acute illness with pancreatitis versus EtOH withdrawal

## 2023-10-15 NOTE — PROGRESS NOTES
General Surgery  Progress Note   [place] 43 y.o. male MRN: [id number]  Unit/Bed#: E4 -01 Encounter: [id number]    Assessment:  39-year-old male with necrotizing pancreatitis secondary to alcohol abuse. Persistent tachycardia, otherwise vital signs stable, afebrile. On room air. WBC 7.8 (9.5)  Hgb 12.2 (12.6)  Cr 0.61 (0.58)    Plan:  Clear liquid diet as tolerated, can consider advancing to low fat diet  Continue close monitoring of vital signs, abdominal exam  CIWA protocol per primary team  Recommend electrolyte repletion for K greater than 4, mag greater than 2, Phos greater than 3  DVT ppx: Lovenox  Pain/ nausea control PRN  OOB/ ambulation  Incentive Spirometry  Per primary team      Subjective/Objective     Subjective:   Patient seen and examined at bedside, in no acute distress. No acute events overnight. Pain is well controlled. Reports improved abdominal pain this morning. Feels hungry. Continues with epigastric tenderness. Passing flatus, denies bowel movement since admission. Objective:   Vitals:Blood pressure 114/84, pulse 96, temperature 97.5 °F (36.4 °C), temperature source Temporal, resp. rate 18, height 6' (1.829 m), weight 118 kg (261 lb 0.4 oz), SpO2 92 %. Temp (24hrs), Av.6 °F (37 °C), Min:97.5 °F (36.4 °C), Max:99.4 °F (37.4 °C)        Intake/Output Summary (Last 24 hours) at 10/15/2023 0706  Last data filed at 10/14/2023 2301  Gross per 24 hour   Intake 2161.42 ml   Output --   Net 2161.42 ml       Invasive Devices       Peripheral Intravenous Line  Duration             Peripheral IV 10/14/23 Right Antecubital 1 day                    Physical Exam:  General: No acute distress, alert and oriented  CV: Well perfused, regular rate and rhythm  Lungs: Normal work of breathing, no increased respiratory effort on room air  Abdomen: Soft, tender in epigastric, left upper quadrant, protuberant.     Extremities: No edema, clubbing or cyanosis  Skin: Warm, dry    Lab Results: BMP/CMP:   Lab Results   Component Value Date    SODIUM 137 10/15/2023    K 3.3 (L) 10/15/2023    CL 98 10/15/2023    CO2 31 10/15/2023    BUN 6 10/15/2023    CREATININE 0.61 10/15/2023    CALCIUM 8.5 10/15/2023    AST 20 10/15/2023    ALT 31 10/15/2023    ALKPHOS 43 10/15/2023    EGFR 125 10/15/2023    and CBC:   Lab Results   Component Value Date    WBC 7.79 10/15/2023    HGB 12.2 10/15/2023    HCT 37.8 10/15/2023    MCV 95 10/15/2023     10/15/2023    RBC 4.00 10/15/2023    MCH 30.5 10/15/2023    MCHC 32.3 10/15/2023    RDW 13.8 10/15/2023    MPV 10.5 10/15/2023    NRBC 0 10/15/2023     VTE Prophylaxis: Sequential compression device (Venodyne)  and Enoxaparin (Lovenox)    Noah Chairez MD  10/15/2023

## 2023-10-15 NOTE — PLAN OF CARE
Problem: MOBILITY - ADULT  Goal: Maintain or return to baseline ADL function  Description: INTERVENTIONS:  -  Assess patient's ability to carry out ADLs; assess patient's baseline for ADL function and identify physical deficits which impact ability to perform ADLs (bathing, care of mouth/teeth, toileting, grooming, dressing, etc.)  - Assess/evaluate cause of self-care deficits   - Assess range of motion  - Assess patient's mobility; develop plan if impaired  - Assess patient's need for assistive devices and provide as appropriate  - Encourage maximum independence but intervene and supervise when necessary  - Involve family in performance of ADLs  - Assess for home care needs following discharge   - Consider OT consult to assist with ADL evaluation and planning for discharge  - Provide patient education as appropriate  Outcome: Progressing  Goal: Maintains/Returns to pre admission functional level  Description: INTERVENTIONS:  - Perform BMAT or MOVE assessment daily.   - Set and communicate daily mobility goal to care team and patient/family/caregiver.    - Collaborate with rehabilitation services on mobility goals if consulted  - Out of bed for toileting  - Record patient progress and toleration of activity level   Outcome: Progressing     Problem: PAIN - ADULT  Goal: Verbalizes/displays adequate comfort level or baseline comfort level  Description: Interventions:  - Encourage patient to monitor pain and request assistance  - Assess pain using appropriate pain scale  - Administer analgesics based on type and severity of pain and evaluate response  - Implement non-pharmacological measures as appropriate and evaluate response  - Consider cultural and social influences on pain and pain management  - Notify physician/advanced practitioner if interventions unsuccessful or patient reports new pain  Outcome: Progressing     Problem: INFECTION - ADULT  Goal: Absence or prevention of progression during hospitalization  Description: INTERVENTIONS:  - Assess and monitor for signs and symptoms of infection  - Monitor lab/diagnostic results  - Monitor all insertion sites, i.e. indwelling lines, tubes, and drains  - Monitor endotracheal if appropriate and nasal secretions for changes in amount and color  - [place] appropriate cooling/warming therapies per order  - Administer medications as ordered  - Instruct and encourage patient and family to use good hand hygiene technique  - Identify and instruct in appropriate isolation precautions for identified infection/condition  Outcome: Progressing  Goal: Absence of fever/infection during neutropenic period  Description: INTERVENTIONS:  - Monitor WBC    Outcome: Progressing     Problem: SAFETY ADULT  Goal: Maintain or return to baseline ADL function  Description: INTERVENTIONS:  -  Assess patient's ability to carry out ADLs; assess patient's baseline for ADL function and identify physical deficits which impact ability to perform ADLs (bathing, care of mouth/teeth, toileting, grooming, dressing, etc.)  - Assess/evaluate cause of self-care deficits   - Assess range of motion  - Assess patient's mobility; develop plan if impaired  - Assess patient's need for assistive devices and provide as appropriate  - Encourage maximum independence but intervene and supervise when necessary  - Involve family in performance of ADLs  - Assess for home care needs following discharge   - Consider OT consult to assist with ADL evaluation and planning for discharge  - Provide patient education as appropriate  Outcome: Progressing  Goal: Maintains/Returns to pre admission functional level  Description: INTERVENTIONS:  - Perform BMAT or MOVE assessment daily.   - Set and communicate daily mobility goal to care team and patient/family/caregiver.    - Collaborate with rehabilitation services on mobility goals if consulted  - Out of bed for toileting  - Record patient progress and toleration of activity level   Outcome: Progressing  Goal: Patient will remain free of falls  Description: INTERVENTIONS:  - Educate patient/family on patient safety including physical limitations  - Instruct patient to call for assistance with activity   - Consult OT/PT to assist with strengthening/mobility   - Keep Call bell within reach  - Keep bed low and locked with side rails adjusted as appropriate  - Keep care items and personal belongings within reach  - Initiate and maintain comfort rounds  - Make Fall Risk Sign visible to staff  - Apply yellow socks and bracelet for high fall risk patients  - Consider moving patient to room near nurses station  Outcome: Progressing     Problem: DISCHARGE PLANNING  Goal: Discharge to home or other facility with appropriate resources  Description: INTERVENTIONS:  - Identify barriers to discharge w/patient and caregiver  - Arrange for needed discharge resources and transportation as appropriate  - Identify discharge learning needs (meds, wound care, etc.)  - Arrange for interpretive services to assist at discharge as needed  - Refer to Case Management Department for coordinating discharge planning if the patient needs post-hospital services based on physician/advanced practitioner order or complex needs related to functional status, cognitive ability, or social support system  Outcome: Progressing     Problem: Knowledge Deficit  Goal: Patient/family/caregiver demonstrates understanding of disease process, treatment plan, medications, and discharge instructions  Description: Complete learning assessment and assess knowledge base.   Interventions:  - Provide teaching at level of understanding  - Provide teaching via preferred learning methods  Outcome: Progressing     Problem: NEUROSENSORY - ADULT  Goal: Remains free of injury related to seizures activity  Description: INTERVENTIONS  - Maintain airway, patient safety  and administer oxygen as ordered  - Monitor patient for seizure activity, document and report duration and description of seizure to physician/advanced practitioner  - If seizure occurs,  ensure patient safety during seizure  - Reorient patient post seizure  - Seizure pads on all 4 side rails  - Instruct patient/family to notify RN of any seizure activity including if an aura is experienced  - Instruct patient/family to call for assistance with activity based on nursing assessment  - Administer anti-seizure medications if ordered    Outcome: Progressing     Problem: Prexisting or High Potential for Compromised Skin Integrity  Goal: Skin integrity is maintained or improved  Description: INTERVENTIONS:  - Identify patients at risk for skin breakdown  - Assess and monitor skin integrity  - Assess and monitor nutrition and hydration status  - Monitor labs   - Assess for incontinence   - Turn and reposition patient  - Assist with mobility/ambulation  - Relieve pressure over bony prominences  - Avoid friction and shearing  - Provide appropriate hygiene as needed including keeping skin clean and dry  - Evaluate need for skin moisturizer/barrier cream  - Collaborate with interdisciplinary team   - Patient/family teaching  - Consider wound care consult   Outcome: Progressing

## 2023-10-15 NOTE — PLAN OF CARE
Problem: MOBILITY - ADULT  Goal: Maintain or return to baseline ADL function  Description: INTERVENTIONS:  -  Assess patient's ability to carry out ADLs; assess patient's baseline for ADL function and identify physical deficits which impact ability to perform ADLs (bathing, care of mouth/teeth, toileting, grooming, dressing, etc.)  - Assess/evaluate cause of self-care deficits   - Assess range of motion  - Assess patient's mobility; develop plan if impaired  - Assess patient's need for assistive devices and provide as appropriate  - Encourage maximum independence but intervene and supervise when necessary  - Involve family in performance of ADLs  - Assess for home care needs following discharge   - Consider OT consult to assist with ADL evaluation and planning for discharge  - Provide patient education as appropriate  Outcome: Progressing  Goal: Maintains/Returns to pre admission functional level  Description: INTERVENTIONS:  - Perform BMAT or MOVE assessment daily.   - Set and communicate daily mobility goal to care team and patient/family/caregiver.    - Collaborate with rehabilitation services on mobility goals if consulted  - Out of bed for toileting  - Record patient progress and toleration of activity level   Outcome: Progressing     Problem: PAIN - ADULT  Goal: Verbalizes/displays adequate comfort level or baseline comfort level  Description: Interventions:  - Encourage patient to monitor pain and request assistance  - Assess pain using appropriate pain scale  - Administer analgesics based on type and severity of pain and evaluate response  - Implement non-pharmacological measures as appropriate and evaluate response  - Consider cultural and social influences on pain and pain management  - Notify physician/advanced practitioner if interventions unsuccessful or patient reports new pain  Outcome: Progressing     Problem: INFECTION - ADULT  Goal: Absence or prevention of progression during hospitalization  Description: INTERVENTIONS:  - Assess and monitor for signs and symptoms of infection  - Monitor lab/diagnostic results  - Monitor all insertion sites, i.e. indwelling lines, tubes, and drains  - Monitor endotracheal if appropriate and nasal secretions for changes in amount and color  - [place] appropriate cooling/warming therapies per order  - Administer medications as ordered  - Instruct and encourage patient and family to use good hand hygiene technique  - Identify and instruct in appropriate isolation precautions for identified infection/condition  Outcome: Progressing  Goal: Absence of fever/infection during neutropenic period  Description: INTERVENTIONS:  - Monitor WBC    Outcome: Progressing     Problem: SAFETY ADULT  Goal: Maintain or return to baseline ADL function  Description: INTERVENTIONS:  -  Assess patient's ability to carry out ADLs; assess patient's baseline for ADL function and identify physical deficits which impact ability to perform ADLs (bathing, care of mouth/teeth, toileting, grooming, dressing, etc.)  - Assess/evaluate cause of self-care deficits   - Assess range of motion  - Assess patient's mobility; develop plan if impaired  - Assess patient's need for assistive devices and provide as appropriate  - Encourage maximum independence but intervene and supervise when necessary  - Involve family in performance of ADLs  - Assess for home care needs following discharge   - Consider OT consult to assist with ADL evaluation and planning for discharge  - Provide patient education as appropriate  Outcome: Progressing  Goal: Maintains/Returns to pre admission functional level  Description: INTERVENTIONS:  - Perform BMAT or MOVE assessment daily.   - Set and communicate daily mobility goal to care team and patient/family/caregiver.    - Collaborate with rehabilitation services on mobility goals if consulted  - Out of bed for toileting  - Record patient progress and toleration of activity level   Outcome: Progressing  Goal: Patient will remain free of falls  Description: INTERVENTIONS:  - Educate patient/family on patient safety including physical limitations  - Instruct patient to call for assistance with activity   - Consult OT/PT to assist with strengthening/mobility   - Keep Call bell within reach  - Keep bed low and locked with side rails adjusted as appropriate  - Keep care items and personal belongings within reach  - Initiate and maintain comfort rounds  - Make Fall Risk Sign visible to staff  - Offer Toileting every 2 Hours, in advance of need  - Obtain necessary fall risk management equipment  - Apply yellow socks and bracelet for high fall risk patients  - Consider moving patient to room near nurses station  Outcome: Progressing     Problem: DISCHARGE PLANNING  Goal: Discharge to home or other facility with appropriate resources  Description: INTERVENTIONS:  - Identify barriers to discharge w/patient and caregiver  - Arrange for needed discharge resources and transportation as appropriate  - Identify discharge learning needs (meds, wound care, etc.)  - Arrange for interpretive services to assist at discharge as needed  - Refer to Case Management Department for coordinating discharge planning if the patient needs post-hospital services based on physician/advanced practitioner order or complex needs related to functional status, cognitive ability, or social support system  Outcome: Progressing     Problem: Knowledge Deficit  Goal: Patient/family/caregiver demonstrates understanding of disease process, treatment plan, medications, and discharge instructions  Description: Complete learning assessment and assess knowledge base.   Interventions:  - Provide teaching at level of understanding  - Provide teaching via preferred learning methods  Outcome: Progressing     Problem: NEUROSENSORY - ADULT  Goal: Remains free of injury related to seizures activity  Description: INTERVENTIONS  - Maintain airway, patient safety  and administer oxygen as ordered  - Monitor patient for seizure activity, document and report duration and description of seizure to physician/advanced practitioner  - If seizure occurs,  ensure patient safety during seizure  - Reorient patient post seizure  - Seizure pads on all 4 side rails  - Instruct patient/family to notify RN of any seizure activity including if an aura is experienced  - Instruct patient/family to call for assistance with activity based on nursing assessment  - Administer anti-seizure medications if ordered    Outcome: Progressing     Problem: Prexisting or High Potential for Compromised Skin Integrity  Goal: Skin integrity is maintained or improved  Description: INTERVENTIONS:  - Identify patients at risk for skin breakdown  - Assess and monitor skin integrity  - Assess and monitor nutrition and hydration status  - Monitor labs   - Assess for incontinence   - Turn and reposition patient  - Assist with mobility/ambulation  - Relieve pressure over bony prominences  - Avoid friction and shearing  - Provide appropriate hygiene as needed including keeping skin clean and dry  - Evaluate need for skin moisturizer/barrier cream  - Collaborate with interdisciplinary team   - Patient/family teaching  - Consider wound care consult   Outcome: Progressing

## 2023-10-15 NOTE — ASSESSMENT & PLAN NOTE
Patient requesting host referral  [facility] protocol  Thiamine and folate repletion  Taper Serax

## 2023-10-15 NOTE — ASSESSMENT & PLAN NOTE
Patient with past medical history of ETOH abuse presented to [facility] ED with abdominal pain radiating to back for 2 days. Initial CT A/P showed "Peripancreatic inflammatory changes consistent with acute pancreatitis" w/o evidence of acute INFX necrosis, or pseudocyst formation. Patient transferred to [facility] for detox. 10/12 repeat CT showed new evidence of pancreatic necrosis, 3.6 x 3.1 cm, with worsening of extensive retroperitoneal fat stranding and edema. Advance to Full Lq Diet; make NPO if worsening abdo pain   Pain control- morphine IV PRN; begin tapering down  Monitor for worsening signs or symptoms of necrosis    Daily CBC  Trend lactate:  WNL   LACHO  Consult general surgery to evaluate

## 2023-10-16 LAB
ANION GAP SERPL CALCULATED.3IONS-SCNC: 7 MMOL/L
ATRIAL RATE: 105 BPM
BASOPHILS # BLD AUTO: 0.04 THOUSANDS/ÂΜL (ref 0–0.1)
BASOPHILS NFR BLD AUTO: 1 % (ref 0–1)
BUN SERPL-MCNC: 5 MG/DL (ref 5–25)
CALCIUM SERPL-MCNC: 7.9 MG/DL (ref 8.4–10.2)
CHLORIDE SERPL-SCNC: 102 MMOL/L (ref 96–108)
CO2 SERPL-SCNC: 29 MMOL/L (ref 21–32)
CREAT SERPL-MCNC: 0.64 MG/DL (ref 0.6–1.3)
EOSINOPHIL # BLD AUTO: 0.18 THOUSAND/ÂΜL (ref 0–0.61)
EOSINOPHIL NFR BLD AUTO: 2 % (ref 0–6)
ERYTHROCYTE [DISTWIDTH] IN BLOOD BY AUTOMATED COUNT: 14.2 % (ref 11.6–15.1)
GFR SERPL CREATININE-BSD FRML MDRD: 123 ML/MIN/1.73SQ M
GLUCOSE SERPL-MCNC: 96 MG/DL (ref 65–140)
HCT VFR BLD AUTO: 37.5 % (ref 36.5–49.3)
HGB BLD-MCNC: 12 G/DL (ref 12–17)
IMM GRANULOCYTES # BLD AUTO: 0.1 THOUSAND/UL (ref 0–0.2)
IMM GRANULOCYTES NFR BLD AUTO: 1 % (ref 0–2)
LYMPHOCYTES # BLD AUTO: 1.49 THOUSANDS/ÂΜL (ref 0.6–4.47)
LYMPHOCYTES NFR BLD AUTO: 17 % (ref 14–44)
MCH RBC QN AUTO: 30.8 PG (ref 26.8–34.3)
MCHC RBC AUTO-ENTMCNC: 32 G/DL (ref 31.4–37.4)
MCV RBC AUTO: 96 FL (ref 82–98)
MONOCYTES # BLD AUTO: 0.99 THOUSAND/ÂΜL (ref 0.17–1.22)
MONOCYTES NFR BLD AUTO: 11 % (ref 4–12)
NEUTROPHILS # BLD AUTO: 6.01 THOUSANDS/ÂΜL (ref 1.85–7.62)
NEUTS SEG NFR BLD AUTO: 68 % (ref 43–75)
NRBC BLD AUTO-RTO: 0 /100 WBCS
P AXIS: 37 DEGREES
PLATELET # BLD AUTO: 220 THOUSANDS/UL (ref 149–390)
PMV BLD AUTO: 10.8 FL (ref 8.9–12.7)
POTASSIUM SERPL-SCNC: 3.5 MMOL/L (ref 3.5–5.3)
PR INTERVAL: 126 MS
QRS AXIS: 28 DEGREES
QRSD INTERVAL: 92 MS
QT INTERVAL: 328 MS
QTC INTERVAL: 433 MS
RBC # BLD AUTO: 3.9 MILLION/UL (ref 3.88–5.62)
SODIUM SERPL-SCNC: 138 MMOL/L (ref 135–147)
T WAVE AXIS: 39 DEGREES
VENTRICULAR RATE: 105 BPM
WBC # BLD AUTO: 8.81 THOUSAND/UL (ref 4.31–10.16)

## 2023-10-16 PROCEDURE — 93010 ELECTROCARDIOGRAM REPORT: CPT | Performed by: INTERNAL MEDICINE

## 2023-10-16 PROCEDURE — NC001 PR NO CHARGE: Performed by: SURGERY

## 2023-10-16 PROCEDURE — 80048 BASIC METABOLIC PNL TOTAL CA: CPT | Performed by: INTERNAL MEDICINE

## 2023-10-16 PROCEDURE — 99232 SBSQ HOSP IP/OBS MODERATE 35: CPT | Performed by: INTERNAL MEDICINE

## 2023-10-16 PROCEDURE — 85025 COMPLETE CBC W/AUTO DIFF WBC: CPT | Performed by: INTERNAL MEDICINE

## 2023-10-16 RX ORDER — POLYETHYLENE GLYCOL 3350 17 G/17G
17 POWDER, FOR SOLUTION ORAL DAILY
Status: DISCONTINUED | OUTPATIENT
Start: 2023-10-16 | End: 2023-10-17 | Stop reason: HOSPADM

## 2023-10-16 RX ORDER — FOLIC ACID 1 MG/1
1 TABLET ORAL DAILY
Status: DISCONTINUED | OUTPATIENT
Start: 2023-10-16 | End: 2023-10-17 | Stop reason: HOSPADM

## 2023-10-16 RX ORDER — LANOLIN ALCOHOL/MO/W.PET/CERES
100 CREAM (GRAM) TOPICAL DAILY
Status: DISCONTINUED | OUTPATIENT
Start: 2023-10-16 | End: 2023-10-17 | Stop reason: HOSPADM

## 2023-10-16 RX ORDER — AMOXICILLIN 250 MG
1 CAPSULE ORAL 2 TIMES DAILY
Status: DISCONTINUED | OUTPATIENT
Start: 2023-10-16 | End: 2023-10-17 | Stop reason: HOSPADM

## 2023-10-16 RX ORDER — OXYCODONE HYDROCHLORIDE 5 MG/1
5 TABLET ORAL EVERY 4 HOURS PRN
Status: DISCONTINUED | OUTPATIENT
Start: 2023-10-16 | End: 2023-10-17 | Stop reason: HOSPADM

## 2023-10-16 RX ADMIN — MORPHINE SULFATE 4 MG: 4 INJECTION INTRAVENOUS at 00:06

## 2023-10-16 RX ADMIN — OXYCODONE HYDROCHLORIDE 5 MG: 5 TABLET ORAL at 22:20

## 2023-10-16 RX ADMIN — OXAZEPAM 10 MG: 10 CAPSULE, GELATIN COATED ORAL at 13:08

## 2023-10-16 RX ADMIN — SENNOSIDES AND DOCUSATE SODIUM 1 TABLET: 8.6; 5 TABLET ORAL at 17:02

## 2023-10-16 RX ADMIN — Medication 21 MG: at 09:27

## 2023-10-16 RX ADMIN — PANTOPRAZOLE SODIUM 40 MG: 40 TABLET, DELAYED RELEASE ORAL at 05:00

## 2023-10-16 RX ADMIN — SODIUM CHLORIDE 125 ML/HR: 0.9 INJECTION, SOLUTION INTRAVENOUS at 00:08

## 2023-10-16 RX ADMIN — SENNOSIDES AND DOCUSATE SODIUM 1 TABLET: 8.6; 5 TABLET ORAL at 11:44

## 2023-10-16 RX ADMIN — FOLIC ACID 1 MG: 1 TABLET ORAL at 11:44

## 2023-10-16 RX ADMIN — POLYETHYLENE GLYCOL 3350 17 G: 17 POWDER, FOR SOLUTION ORAL at 11:44

## 2023-10-16 RX ADMIN — OXAZEPAM 10 MG: 10 CAPSULE, GELATIN COATED ORAL at 22:21

## 2023-10-16 RX ADMIN — OXYCODONE HYDROCHLORIDE 5 MG: 5 TABLET ORAL at 17:02

## 2023-10-16 RX ADMIN — OXAZEPAM 10 MG: 10 CAPSULE, GELATIN COATED ORAL at 05:00

## 2023-10-16 RX ADMIN — MORPHINE SULFATE 4 MG: 4 INJECTION INTRAVENOUS at 09:17

## 2023-10-16 RX ADMIN — MORPHINE SULFATE 4 MG: 4 INJECTION INTRAVENOUS at 04:47

## 2023-10-16 RX ADMIN — THIAMINE HCL TAB 100 MG 100 MG: 100 TAB at 11:44

## 2023-10-16 RX ADMIN — ENOXAPARIN SODIUM 40 MG: 40 INJECTION SUBCUTANEOUS at 09:27

## 2023-10-16 RX ADMIN — MORPHINE SULFATE 4 MG: 4 INJECTION INTRAVENOUS at 13:08

## 2023-10-16 NOTE — PROGRESS NOTES
[facility]  Progress Note  Name: Jeanine Murphy  MRN: [id number]  Unit/Bed#: E4 -01 I Date of Admission: 10/12/2023   Date of Service: 10/16/2023 I Hospital Day: 4    Assessment/Plan   * Alcohol-induced acute pancreatitis with infected necrosis  Assessment & Plan  History of alcoholism and hypertension who was transferred from [facility] to [facility] for detox subsequently sent here for pancreatic necrosis  Patient being followed by surgery and has did well with bowel rest  Diet slowly advanced today to low-fat  Possible discharge next 24 hours if stable    Elevated LFTs  Assessment & Plan  Secondary to alcohol use     Results from last 7 days   Lab Units 10/15/23  0532 10/14/23  0618 10/13/23  0533 10/12/23  0327 10/11/23  0447 10/10/23  1642   AST U/L 20 33 16 23 40* 65*   ALT U/L 31 35 37 59* 106* 134*   TOTAL BILIRUBIN mg/dL 0.98 1.73* 1.59* 1.33* 0.76 0.57       Alcohol use disorder, severe, dependence (HCC)  Assessment & Plan  WA protocol stable. Continue thiamine and folic acid. Continue oxazepam  Patient not interested in inpatient alcohol rehab    HTN, goal below 140/90  Assessment & Plan  Stable can resume lisinopril after discharge    VTE Pharmacologic Prophylaxis: VTE Score: 3 Moderate Risk (Score 3-4) - Pharmacological DVT Prophylaxis Ordered: enoxaparin (Lovenox). Patient Centered Rounds: I have performed bedside rounds with nursing staff today. Discussions with Specialists or Other Care Team Provider: Case management    Education and Discussions with Family / Patient:     Time Spent for Care: This time was spent on one or more of the following: performing physical exam; counseling and coordination of care; obtaining or reviewing history; documenting in the medical record; reviewing/ordering tests, medications or procedures; communicating with other healthcare professionals and discussing with patient's family/caregivers.     Current Length of Stay: 4 day(s)  Current Patient Status: Inpatient   Certification Statement: The patient will continue to require additional inpatient hospital stay due to pancreatitis  Discharge Plan: Anticipate discharge in 24-48 hrs to home. Code Status: Level 1 - Full Code      Subjective:   Patient seen and examined. No complaints. Feeling better    Objective:   Vitals: Blood pressure 131/75, pulse 90, temperature 97.9 °F (36.6 °C), temperature source Temporal, resp. rate 16, height 6' (1.829 m), weight 119 kg (262 lb), SpO2 95 %. Intake/Output Summary (Last 24 hours) at 10/16/2023 1042  Last data filed at 10/16/2023 0701  Gross per 24 hour   Intake 2220.42 ml   Output --   Net 2220.42 ml       Physical Exam  Vitals reviewed. Constitutional:       General: He is not in acute distress. Appearance: He is obese. HENT:      Head: Atraumatic. Cardiovascular:      Rate and Rhythm: Regular rhythm. Pulmonary:      Effort: Pulmonary effort is normal.      Breath sounds: No wheezing. Abdominal:      General: Bowel sounds are normal.      Palpations: Abdomen is soft. Tenderness: There is no abdominal tenderness. Musculoskeletal:         General: No swelling or tenderness. Skin:     General: Skin is warm and dry. Neurological:      General: No focal deficit present. Mental Status: He is alert. Cranial Nerves: No cranial nerve deficit. Psychiatric:         Mood and Affect: Mood normal.       Additional Data:   Labs:  Results from last 7 days   Lab Units 10/16/23  0518 10/15/23  0532 10/14/23  0634 10/11/23  0447 10/10/23  1642   WBC Thousand/uL 8.81 7.79 9.52   < > 11.11*   HEMOGLOBIN g/dL 12.0 12.2 12.6   < > 16.4   PLATELETS Thousands/uL 220 192 167   < > 175   MCV fL 96 95 92   < > 89   INR   --   --   --   --  0.92    < > = values in this interval not displayed.      Results from last 7 days   Lab Units 10/16/23  0518 10/15/23  0532 10/14/23  0705 10/14/23  0618 10/13/23  0533   SODIUM mmol/L 138 137 133*  --  135   POTASSIUM mmol/L 3.5 3.3* 4.2  --  3.4*   CHLORIDE mmol/L 102 98 98  --  100   CO2 mmol/L 29 31 29  --  27   ANION GAP mmol/L 7 8 6  --  8   BUN mg/dL 5 6 5  --  7   CREATININE mg/dL 0.64 0.61 0.58*  --  0.59*   CALCIUM mg/dL 7.9* 8.5 8.1*  --  7.9*   ALBUMIN g/dL  --  3.3*  --  3.3* 3.3*   TOTAL BILIRUBIN mg/dL  --  0.98  --  1.73* 1.59*   ALK PHOS U/L  --  43  --  43 37   ALT U/L  --  31  --  35 37   AST U/L  --  20  --  33 16   EGFR ml/min/1.73sq m 123 125 128  --  127   GLUCOSE RANDOM mg/dL 96 88 103  --  96     Results from last 7 days   Lab Units 10/15/23  0532 10/11/23  0447 10/10/23  1642   MAGNESIUM mg/dL 2.4 2.0 1.9   PHOSPHORUS mg/dL 3.1  --   --          Results from last 7 days   Lab Units 10/10/23  1842 10/10/23  1642   HS TNI 0HR ng/L  --  3   HS TNI 2HR ng/L 4  --           Results from last 7 days   Lab Units 10/14/23  0618 10/12/23  1014 10/12/23  1014 10/10/23  1842 10/10/23  1642   LACTIC ACID mmol/L  --   --  0.7 1.3 2.3*   PROCALCITONIN ng/ml 0.44*   < > 0.47*  --   --     < > = values in this interval not displayed. * I Have Reviewed All Lab Data Listed Above. Cultures:   Results from last 7 days   Lab Units 10/12/23  1013 10/12/23  0953   BLOOD CULTURE  No Growth at 72 hrs. No Growth at 72 hrs.  --    INFLUENZA A PCR   --  Negative       Results from last 7 days   Lab Units 10/12/23  0953   SARS-COV-2  Negative   INFLUENZA A PCR  Negative   INFLUENZA B PCR  Negative   RSV PCR  Negative           Lines/Drains:  Invasive Devices       Peripheral Intravenous Line  Duration             Peripheral IV 10/14/23 Right Antecubital 2 days    Peripheral IV 10/16/23 Dorsal (posterior); Right Hand <1 day                  Telemetry:      Imaging:  Imaging Reports Reviewed Today Include:   CTA chest (pe study) abdomen pelvis contrast    Result Date: 10/12/2023  Impression: No pulmonary embolus. Mild bibasilar atelectasis and minimal pleural effusions.  Mild pulmonary emphysema noted. Acute pancreatitis with interval development of necrosis (less than 30%) involving the tail. Interval worsening of extensive retroperitoneal fat stranding and edema. No discrete collections. The study was marked in [facility] for immediate notification. Workstation performed: [id number]     XR chest portable    Result Date: 10/12/2023  Impression: No acute cardiopulmonary disease. Workstation performed: [id number]       Scheduled Meds:  Current Facility-Administered Medications   Medication Dose Route Frequency Provider Last Rate    enoxaparin  40 mg Subcutaneous Daily Lily Romero MD      morphine injection  4 mg Intravenous Q3H PRN Ángel Mittal MD      nicotine  21 mg Transdermal Daily Lily Romero MD      ondansetron  4 mg Intravenous Q8H PRN Lily Romero MD      oxazepam  10 mg Oral [facility] Ángel Mittal MD      pantoprazole  40 mg Oral Early Morning Ángel Mittal MD      sodium chloride  125 mL/hr Intravenous Continuous Ángel Mittal  mL/hr (10/16/23 0008)       Today, Patient Was Seen By: Tommy oLo DO    ** Please Note: Dictation voice to text software may have been used in the creation of this document.  **

## 2023-10-16 NOTE — PLAN OF CARE
Problem: MOBILITY - ADULT  Goal: Maintain or return to baseline ADL function  Description: INTERVENTIONS:  -  Assess patient's ability to carry out ADLs; assess patient's baseline for ADL function and identify physical deficits which impact ability to perform ADLs (bathing, care of mouth/teeth, toileting, grooming, dressing, etc.)  - Assess/evaluate cause of self-care deficits   - Assess range of motion  - Assess patient's mobility; develop plan if impaired  - Assess patient's need for assistive devices and provide as appropriate  - Encourage maximum independence but intervene and supervise when necessary  - Involve family in performance of ADLs  - Assess for home care needs following discharge   - Consider OT consult to assist with ADL evaluation and planning for discharge  - Provide patient education as appropriate  Outcome: Progressing  Goal: Maintains/Returns to pre admission functional level  Description: INTERVENTIONS:  - Perform BMAT or MOVE assessment daily.   - Set and communicate daily mobility goal to care team and patient/family/caregiver.    - Collaborate with rehabilitation services on mobility goals if consulted  - Out of bed for toileting  - Record patient progress and toleration of activity level   Outcome: Progressing     Problem: PAIN - ADULT  Goal: Verbalizes/displays adequate comfort level or baseline comfort level  Description: Interventions:  - Encourage patient to monitor pain and request assistance  - Assess pain using appropriate pain scale  - Administer analgesics based on type and severity of pain and evaluate response  - Implement non-pharmacological measures as appropriate and evaluate response  - Consider cultural and social influences on pain and pain management  - Notify physician/advanced practitioner if interventions unsuccessful or patient reports new pain  Outcome: Progressing     Problem: INFECTION - ADULT  Goal: Absence or prevention of progression during hospitalization  Description: INTERVENTIONS:  - Assess and monitor for signs and symptoms of infection  - Monitor lab/diagnostic results  - Monitor all insertion sites, i.e. indwelling lines, tubes, and drains  - Monitor endotracheal if appropriate and nasal secretions for changes in amount and color  - [place] appropriate cooling/warming therapies per order  - Administer medications as ordered  - Instruct and encourage patient and family to use good hand hygiene technique  - Identify and instruct in appropriate isolation precautions for identified infection/condition  Outcome: Progressing  Goal: Absence of fever/infection during neutropenic period  Description: INTERVENTIONS:  - Monitor WBC    Outcome: Progressing     Problem: SAFETY ADULT  Goal: Maintain or return to baseline ADL function  Description: INTERVENTIONS:  -  Assess patient's ability to carry out ADLs; assess patient's baseline for ADL function and identify physical deficits which impact ability to perform ADLs (bathing, care of mouth/teeth, toileting, grooming, dressing, etc.)  - Assess/evaluate cause of self-care deficits   - Assess range of motion  - Assess patient's mobility; develop plan if impaired  - Assess patient's need for assistive devices and provide as appropriate  - Encourage maximum independence but intervene and supervise when necessary  - Involve family in performance of ADLs  - Assess for home care needs following discharge   - Consider OT consult to assist with ADL evaluation and planning for discharge  - Provide patient education as appropriate  Outcome: Progressing  Goal: Maintains/Returns to pre admission functional level  Description: INTERVENTIONS:  - Perform BMAT or MOVE assessment daily.   - Set and communicate daily mobility goal to care team and patient/family/caregiver.    - Collaborate with rehabilitation services on mobility goals if consulted  - Out of bed for toileting  - Record patient progress and toleration of activity level   Outcome: Progressing  Goal: Patient will remain free of falls  Description: INTERVENTIONS:  - Educate patient/family on patient safety including physical limitations  - Instruct patient to call for assistance with activity   - Consult OT/PT to assist with strengthening/mobility   - Keep Call bell within reach  - Keep bed low and locked with side rails adjusted as appropriate  - Keep care items and personal belongings within reach  - Initiate and maintain comfort rounds  - Make Fall Risk Sign visible to staff  - Offer Toileting every 2 Hours, in advance of need  - Apply yellow socks and bracelet for high fall risk patients  - Consider moving patient to room near nurses station  Outcome: Progressing     Problem: DISCHARGE PLANNING  Goal: Discharge to home or other facility with appropriate resources  Description: INTERVENTIONS:  - Identify barriers to discharge w/patient and caregiver  - Arrange for needed discharge resources and transportation as appropriate  - Identify discharge learning needs (meds, wound care, etc.)  - Arrange for interpretive services to assist at discharge as needed  - Refer to Case Management Department for coordinating discharge planning if the patient needs post-hospital services based on physician/advanced practitioner order or complex needs related to functional status, cognitive ability, or social support system  Outcome: Progressing     Problem: Knowledge Deficit  Goal: Patient/family/caregiver demonstrates understanding of disease process, treatment plan, medications, and discharge instructions  Description: Complete learning assessment and assess knowledge base.   Interventions:  - Provide teaching at level of understanding  - Provide teaching via preferred learning methods  Outcome: Progressing     Problem: NEUROSENSORY - ADULT  Goal: Remains free of injury related to seizures activity  Description: INTERVENTIONS  - Maintain airway, patient safety  and administer oxygen as ordered  - Monitor patient for seizure activity, document and report duration and description of seizure to physician/advanced practitioner  - If seizure occurs,  ensure patient safety during seizure  - Reorient patient post seizure  - Seizure pads on all 4 side rails  - Instruct patient/family to notify RN of any seizure activity including if an aura is experienced  - Instruct patient/family to call for assistance with activity based on nursing assessment  - Administer anti-seizure medications if ordered    Outcome: Progressing     Problem: Prexisting or High Potential for Compromised Skin Integrity  Goal: Skin integrity is maintained or improved  Description: INTERVENTIONS:  - Identify patients at risk for skin breakdown  - Assess and monitor skin integrity  - Assess and monitor nutrition and hydration status  - Monitor labs   - Assess for incontinence   - Turn and reposition patient  - Assist with mobility/ambulation  - Relieve pressure over bony prominences  - Avoid friction and shearing  - Provide appropriate hygiene as needed including keeping skin clean and dry  - Evaluate need for skin moisturizer/barrier cream  - Collaborate with interdisciplinary team   - Patient/family teaching  - Consider wound care consult   Outcome: Progressing

## 2023-10-16 NOTE — ASSESSMENT & PLAN NOTE
[facility] protocol stable. Continue thiamine and folic acid.     Continue oxazepam  Patient not interested in inpatient alcohol rehab

## 2023-10-16 NOTE — PLAN OF CARE
Problem: MOBILITY - ADULT  Goal: Maintain or return to baseline ADL function  Description: INTERVENTIONS:  -  Assess patient's ability to carry out ADLs; assess patient's baseline for ADL function and identify physical deficits which impact ability to perform ADLs (bathing, care of mouth/teeth, toileting, grooming, dressing, etc.)  - Assess/evaluate cause of self-care deficits   - Assess range of motion  - Assess patient's mobility; develop plan if impaired  - Assess patient's need for assistive devices and provide as appropriate  - Encourage maximum independence but intervene and supervise when necessary  - Involve family in performance of ADLs  - Assess for home care needs following discharge   - Consider OT consult to assist with ADL evaluation and planning for discharge  - Provide patient education as appropriate  Outcome: Progressing  Goal: Maintains/Returns to pre admission functional level  Description: INTERVENTIONS:  - Perform BMAT or MOVE assessment daily.   - Set and communicate daily mobility goal to care team and patient/family/caregiver.    - Collaborate with rehabilitation services on mobility goals if consulted  - Out of bed for toileting  - Record patient progress and toleration of activity level   Outcome: Progressing     Problem: PAIN - ADULT  Goal: Verbalizes/displays adequate comfort level or baseline comfort level  Description: Interventions:  - Encourage patient to monitor pain and request assistance  - Assess pain using appropriate pain scale  - Administer analgesics based on type and severity of pain and evaluate response  - Implement non-pharmacological measures as appropriate and evaluate response  - Consider cultural and social influences on pain and pain management  - Notify physician/advanced practitioner if interventions unsuccessful or patient reports new pain  Outcome: Progressing     Problem: INFECTION - ADULT  Goal: Absence or prevention of progression during hospitalization  Description: INTERVENTIONS:  - Assess and monitor for signs and symptoms of infection  - Monitor lab/diagnostic results  - Monitor all insertion sites, i.e. indwelling lines, tubes, and drains  - Monitor endotracheal if appropriate and nasal secretions for changes in amount and color  - [place] appropriate cooling/warming therapies per order  - Administer medications as ordered  - Instruct and encourage patient and family to use good hand hygiene technique  - Identify and instruct in appropriate isolation precautions for identified infection/condition  Outcome: Progressing  Goal: Absence of fever/infection during neutropenic period  Description: INTERVENTIONS:  - Monitor WBC    Outcome: Progressing     Problem: SAFETY ADULT  Goal: Maintain or return to baseline ADL function  Description: INTERVENTIONS:  -  Assess patient's ability to carry out ADLs; assess patient's baseline for ADL function and identify physical deficits which impact ability to perform ADLs (bathing, care of mouth/teeth, toileting, grooming, dressing, etc.)  - Assess/evaluate cause of self-care deficits   - Assess range of motion  - Assess patient's mobility; develop plan if impaired  - Assess patient's need for assistive devices and provide as appropriate  - Encourage maximum independence but intervene and supervise when necessary  - Involve family in performance of ADLs  - Assess for home care needs following discharge   - Consider OT consult to assist with ADL evaluation and planning for discharge  - Provide patient education as appropriate  Outcome: Progressing  Goal: Maintains/Returns to pre admission functional level  Description: INTERVENTIONS:  - Perform BMAT or MOVE assessment daily.   - Set and communicate daily mobility goal to care team and patient/family/caregiver.    - Collaborate with rehabilitation services on mobility goals if consulted  - Out of bed for toileting  - Record patient progress and toleration of activity level   Outcome: Progressing  Goal: Patient will remain free of falls  Description: INTERVENTIONS:  - Educate patient/family on patient safety including physical limitations  - Instruct patient to call for assistance with activity   - Consult OT/PT to assist with strengthening/mobility   - Keep Call bell within reach  - Keep bed low and locked with side rails adjusted as appropriate  - Keep care items and personal belongings within reach  - Initiate and maintain comfort rounds  - Make Fall Risk Sign visible to staff  Outcome: Progressing     Problem: DISCHARGE PLANNING  Goal: Discharge to home or other facility with appropriate resources  Description: INTERVENTIONS:  - Identify barriers to discharge w/patient and caregiver  - Arrange for needed discharge resources and transportation as appropriate  - Identify discharge learning needs (meds, wound care, etc.)  - Arrange for interpretive services to assist at discharge as needed  - Refer to Case Management Department for coordinating discharge planning if the patient needs post-hospital services based on physician/advanced practitioner order or complex needs related to functional status, cognitive ability, or social support system  Outcome: Progressing     Problem: Knowledge Deficit  Goal: Patient/family/caregiver demonstrates understanding of disease process, treatment plan, medications, and discharge instructions  Description: Complete learning assessment and assess knowledge base.   Interventions:  - Provide teaching at level of understanding  - Provide teaching via preferred learning methods  Outcome: Progressing     Problem: NEUROSENSORY - ADULT  Goal: Remains free of injury related to seizures activity  Description: INTERVENTIONS  - Maintain airway, patient safety  and administer oxygen as ordered  - Monitor patient for seizure activity, document and report duration and description of seizure to physician/advanced practitioner  - If seizure occurs, ensure patient safety during seizure  - Reorient patient post seizure  - Seizure pads on all 4 side rails  - Instruct patient/family to notify RN of any seizure activity including if an aura is experienced  - Instruct patient/family to call for assistance with activity based on nursing assessment  - Administer anti-seizure medications if ordered    Outcome: Progressing     Problem: Prexisting or High Potential for Compromised Skin Integrity  Goal: Skin integrity is maintained or improved  Description: INTERVENTIONS:  - Identify patients at risk for skin breakdown  - Assess and monitor skin integrity  - Assess and monitor nutrition and hydration status  - Monitor labs   - Assess for incontinence   - Turn and reposition patient  - Assist with mobility/ambulation  - Relieve pressure over bony prominences  - Avoid friction and shearing  - Provide appropriate hygiene as needed including keeping skin clean and dry  - Evaluate need for skin moisturizer/barrier cream  - Collaborate with interdisciplinary team   - Patient/family teaching  - Consider wound care consult   Outcome: Progressing

## 2023-10-16 NOTE — PROGRESS NOTES
Progress Note - General Surgery  : General Surgery Resident on Yanira Damon 43 y.o. male MRN: [id number]  Unit/Bed#: E4 -[facility] Encounter: [id number]    Assessment:  43 y.o. male with pancreatitis secondary to alcohol abuse. Patient was persistently tachycardic, now resolving, otherwise vital signs stable within normal limits on room air    WBC 8.8 from 7.8  Hemoglobin 12 from 12.2  Creatinine 0.64 from 0.61    Plan:  Advance to low-fat diet today  Monitor abdominal exam/clinically following diet advancement  Encourage ambulation/out of bed, 3 times daily  Encourage incentive spirometer use  Pain control as needed  Antiemetic as needed  Anticoagulation Plan-Lovenox  Infection Plan -none  Disposition Plan -pending  Rest of care per primary team    Subjective/Objective     Subjective: No acute events overnight. Patient seen and examined at bedside. Patient reports his pain is improving and minimal at this time. Patient tolerated full liquid diet without nausea and vomiting yesterday. Patient denies fevers chills and shortness of breath. Patient is ambulating to the bathroom and urinating had 1 bowel movement and is passing gas. Objective:     Physical Exam:  GEN: NAD  HEENT: MMM  CV: Well-perfused, now with regular rate  Lung: Normal effort  Ab: Distended, soft, minimally tender in the epigastric and left upper quadrants  Extrem: No lower extremity edema bilaterally  Neuro: A+Ox3     Vitals: Temp:  [97.4 °F (36.3 °C)-98.8 °F (37.1 °C)] 97.9 °F (36.6 °C)  HR:  [] 90  Resp:  [16-18] 16  BP: (128-147)/(75-91) 131/75  Body mass index is 35.53 kg/m². I/O         10/14 0701  10/15 0700 10/15 0701  10/16 0700 10/16 0701  10/17 0700    P. O. 540 360     I.V. (mL/kg) 1621.4 (13.7) 1000 (8.4) 860.4 (7.2)    Total Intake(mL/kg) 2161.4 (18.3) 1360 (11.4) 860.4 (7.2)    Urine (mL/kg/hr)       Total Output       Net +2161.4 +1360 +860.4           Unmeasured Urine Occurrence 2 x                Lab, Imaging and other studies: I have personally reviewed pertinent reports.   , CBC with diff:   Lab Results   Component Value Date    WBC 8.81 10/16/2023    HGB 12.0 10/16/2023    HCT 37.5 10/16/2023    MCV 96 10/16/2023     10/16/2023    RBC 3.90 10/16/2023    MCH 30.8 10/16/2023    MCHC 32.0 10/16/2023    RDW 14.2 10/16/2023    MPV 10.8 10/16/2023    NRBC 0 10/16/2023   , BMP/CMP:   Lab Results   Component Value Date    SODIUM 138 10/16/2023    K 3.5 10/16/2023     10/16/2023    CO2 29 10/16/2023    BUN 5 10/16/2023    CREATININE 0.64 10/16/2023    CALCIUM 7.9 (L) 10/16/2023    EGFR 123 10/16/2023     VTE Pharmacologic Prophylaxis: Enoxaparin (Lovenox)  VTE Mechanical Prophylaxis: sequential compression device    Radha Stahl MD  10/16/2023 8:23 AM

## 2023-10-16 NOTE — UTILIZATION REVIEW
Continued Stay Review    Date: 10/16                          Current Patient Class: IP  Current Level of Care: MS    HPI:38 y.o. male initially admitted on 10/12 with alcohol induced pancreatitis w/ infected necrosis. Assessment/Plan:   Tachycardia improving today. Will advance diet to low fat today has tolerated full liquids and having BM. Needs to ambulate, encourage Incentive spirometry. On exam pt notes pain is improving and now minimal but is using IV Morphine throughout the day. IV fluids d/c today.         Vital Signs:   Date/Time Temp Pulse Resp BP MAP (mmHg) SpO2 Calculated FIO2 (%) - Nasal Cannula Nasal Cannula O2 Flow Rate (L/min) O2 Device Patient Position - Orthostatic VS   10/16/23 1541 97.3 °F (36.3 °C) Abnormal  90 18 124/85 92 96 % -- -- None (Room air) Lying   10/16/23 1334 96.6 °F (35.9 °C) Abnormal  93 -- 117/82 95 94 % -- -- -- Lying   10/16/23 0730 97.9 °F (36.6 °C) 90 18 131/75 -- 95 % -- -- None (Room air) Lying   10/16/23 0006 98.8 °F (37.1 °C) 94 16 147/91 -- 95 % -- -- None (Room air) Lying   10/15/23 1917 -- -- -- -- -- -- -- -- None (Room air) --   10/15/23 1519 97.4 °F (36.3 °C) Abnormal  109 Abnormal  18 128/88 98 93 % -- -- None (Room air) Lying   10/15/23 0744 98.5 °F (36.9 °C) 112 Abnormal  18 148/90 104 91 % -- -- None (Room air) Lying   10/14/23 2303 97.5 °F (36.4 °C) 96 18 114/84 94 92 % -- -- None (Room air) Lying   10/14/23 1531 99.4 °F (37.4 °C) 115 Abnormal  20 150/88 97 91 % 28 2 L/min Nasal cannula Lying   10/14/23 0711 99 °F (37.2 °C) 115 Abnormal  20 150/92 106 93 % 32 3 L/min Nasal cannula Lying   10/14/23 0500 -- 112 Abnormal  -- 160/98 -- -- -- -- -- --   10/14/23 0255 98.1 °F (36.7 °C) 121 Abnormal  20 168/101 Abnormal  114 92 % -- -- -- Lying   10/14/23 0130 98.1 °F (36.7 °C) 120 Abnormal  18 158/98 -- -- -- -- -- --     Pertinent Labs/Diagnostic Results:   Results from last 7 days   Lab Units 10/12/23  0953   SARS-COV-2  Negative     Results from last 7 days Lab Units 10/16/23  0518 10/15/23  0532 10/14/23  0634 10/13/23  0533 10/12/23  0327   WBC Thousand/uL 8.81 7.79 9.52 11.85* 13.33*   HEMOGLOBIN g/dL 12.0 12.2 12.6 13.5 15.4   HEMATOCRIT % 37.5 37.8 36.6 41.5 45.2   PLATELETS Thousands/uL 220 192 167 143* 145*   NEUTROS ABS Thousands/µL 6.01 5.31 7.09 8.76*  --          Results from last 7 days   Lab Units 10/16/23  0518 10/15/23  0532 10/14/23  0705 10/13/23  0533 10/12/23  0327 10/11/23  0447 10/10/23  1642   SODIUM mmol/L 138 137 133* 135 134* 135 135   POTASSIUM mmol/L 3.5 3.3* 4.2 3.4* 3.6 4.2 4.2   CHLORIDE mmol/L 102 98 98 100 101 102 103   CO2 mmol/L 29 31 29 27 27 26 23   ANION GAP mmol/L 7 8 6 8 6 7 9   BUN mg/dL 5 6 5 7 8 8 10   CREATININE mg/dL 0.64 0.61 0.58* 0.59* 0.72 0.63 0.73   EGFR ml/min/1.73sq m 123 125 128 127 117 124 116   CALCIUM mg/dL 7.9* 8.5 8.1* 7.9* 8.1* 8.5 8.8   MAGNESIUM mg/dL  --  2.4  --   --   --  2.0 1.9   PHOSPHORUS mg/dL  --  3.1  --   --   --   --   --      Results from last 7 days   Lab Units 10/15/23  0532 10/14/23  0618 10/13/23  0533 10/12/23  0327 10/11/23  0447   AST U/L 20 33 16 23 40*   ALT U/L 31 35 37 59* 106*   ALK PHOS U/L 43 43 37 35 43   TOTAL PROTEIN g/dL 6.8 6.9 6.4 6.0* 6.7   ALBUMIN g/dL 3.3* 3.3* 3.3* 3.3* 4.0   TOTAL BILIRUBIN mg/dL 0.98 1.73* 1.59* 1.33* 0.76   BILIRUBIN DIRECT mg/dL  --  0.30*  --   --   --          Results from last 7 days   Lab Units 10/16/23  0518 10/15/23  0532 10/14/23  0705 10/13/23  0533 10/12/23  0327 10/11/23  0447 10/10/23  1642   GLUCOSE RANDOM mg/dL 96 88 103 96 116 127 138     Results from last 7 days   Lab Units 10/10/23  1842 10/10/23  1642   HS TNI 0HR ng/L  --  3   HS TNI 2HR ng/L 4  --    HSTNI D2 ng/L 1  --          Results from last 7 days   Lab Units 10/10/23  1642   PROTIME seconds 12.7   INR  0.92   PTT seconds 24         Results from last 7 days   Lab Units 10/14/23  0618 10/12/23  1014   PROCALCITONIN ng/ml 0.44* 0.47*     Results from last 7 days   Lab Units 10/12/23  1014 10/10/23  1842 10/10/23  1642   LACTIC ACID mmol/L 0.7 1.3 2.3*     Results from last 7 days   Lab Units 10/12/23  0327 10/11/23  0447 10/10/23  1642   LIPASE u/L 990* 1,780* 3,832*                 Results from last 7 days   Lab Units 10/12/23  1040 10/10/23  1648   CLARITY UA  Clear Clear   COLOR UA  [place]* Yellow   SPEC GRAV UA  1.010 >=1.030   PH UA  6.0 5.5   GLUCOSE UA mg/dl Negative Negative   KETONES UA mg/dl Negative Negative   BLOOD UA  Negative Negative   PROTEIN UA mg/dl 30 (1+)* 30 (1+)*   NITRITE UA  Negative Negative   BILIRUBIN UA  Negative Small*   UROBILINOGEN UA mg/dL Negative 0.2   LEUKOCYTES UA  25.0* Negative   WBC UA /hpf 2-4 0-1   RBC UA /hpf 0-1 None Seen   BACTERIA UA /hpf Occasional None Seen   EPITHELIAL CELLS WET PREP /hpf Occasional None Seen   MUCUS THREADS  Occasional*  --      Results from last 7 days   Lab Units 10/12/23  0953   INFLUENZA A PCR  Negative   INFLUENZA B PCR  Negative   RSV PCR  Negative         Results from last 7 days   Lab Units 10/10/23  1647   AMPH/METH  Negative   BARBITURATE UR  Negative   BENZODIAZEPINE UR  Negative   COCAINE UR  Negative   METHADONE URINE  Negative   OPIATE UR  Negative   PCP UR  Negative   THC UR  Negative     Results from last 7 days   Lab Units 10/10/23  1642   ETHANOL LVL mg/dL <10                 Results from last 7 days   Lab Units 10/12/23  1013   BLOOD CULTURE  No Growth After 4 Days. No Growth After 4 Days.      Medications:   Scheduled Medications:  enoxaparin, 40 mg, Subcutaneous, Daily  folic acid, 1 mg, Oral, Daily  nicotine, 21 mg, Transdermal, Daily  oxazepam, 10 mg, Oral, Q8H PIETRO  pantoprazole, 40 mg, Oral, Early Morning  polyethylene glycol, 17 g, Oral, Daily  senna-docusate sodium, 1 tablet, Oral, BID  thiamine, 100 mg, Oral, Daily      Continuous IV Infusions:     PRN Meds:  morphine injection, 2 mg, Intravenous, Q4H PRN- x 4 10/15, 10/16  ondansetron, 4 mg, Intravenous, Q8H PRN   oxyCODONE, 5 mg, Oral, Q4H PRN - x 1 10/16    Discharge Plan: D    Network Utilization Review Department  ATTENTION: Please call with any questions or concerns to [phone] and carefully listen to the prompts so that you are directed to the right person. All voicemails are confidential.   For Discharge needs, contact Care Management DC Support Team at [phone] opt. 2  Send all requests for admission clinical reviews, approved or denied determinations and any other requests to dedicated fax number below belonging to the campus where the patient is receiving treatment.  List of dedicated fax numbers for the Facilities:  [place] DENIALS (Administrative/Medical Necessity) [phone]   DISCHARGE SUPPORT TEAM (NETWORK) [id number] [facility] (Maternity/NICU/Pediatrics) [phone]   [address] [facility] 1000 [facility] [phone]   [address] [place] [address] 207 [facility] [address] [facility] [address] [address] [address] [facility] [address] 1300 [facility] [address] [place] [address] [phone]

## 2023-10-16 NOTE — APP STUDENT NOTE
A/P-  Alcohol induced pancreatitis w/ infected necrosis  Patient presented to ED w/ abdominal pain radiating to back for 2 days. CT showed pancreatitis w/ necrosis. Was admitted to ICU at [facility] and then transferred out upon improvement. - Per general surgery: pt can continue on low fat diet  - continue IV morphine PRN for pain. Decrease dose to 2 mg from 4 mg.   - monitor for worsening signs of necrosis and pancreatitis  - [facility] protocol  2. Alcohol use disorder   - [facility] protocol   - Thiamine and folate was discontinued   - Taper Serax  3. Tachycardia   Pt's HR increased to 102 during interview. Otherwise, HR is stabilized. - continue to monitor HR   - ECG shows sinus tachycardia, ECHO shows LVEF of 55%  VTE Pharmacologic Prophylaxis: VTE Score: 3 Moderate Risk (Score 3-4) - Pharmacological DVT Prophylaxis Ordered: enoxaparin (Lovenox). Patient Centered Rounds: I performed bedside rounds with nursing staff today. Code Status: Level 1 - Full Code    Subjective:   Anabel Krabbe is a 44 yo male with alcohol use disorder who presented to the ED w/ abdominal pain radiating to the back for 2 days on 10/12. CT was performed showing pancreatitis with necrosis. Today pt notes a continued abdominal pain radiating to the back but the pain improved with morphine PRN. He also notes his last BM 2 days ago which looked abnormal. He is currently on a toast and crackers diet and notes that he is able to eat and drink well. He is sleeping well and is able to walk around without issue. Pt denies night sweats, tremors, increasing anxiety, chest pain, and palpitations. Denies SOB, nausea, vomiting, bloating, and trouble urinating.  Anabel Krabbe is adjusting well to the hospital.     Objective:     Vitals:   Temp (24hrs), Av °F (36.7 °C), Min:97.4 °F (36.3 °C), Max:98.8 °F (37.1 °C)    Temp:  [97.4 °F (36.3 °C)-98.8 °F (37.1 °C)] 97.9 °F (36.6 °C)  HR:  [] 90  Resp:  [16-18] 18  BP: (128-147)/(75-91) 131/75  SpO2:  [93 %-95 %] 95 %  Body mass index is 35.53 kg/m². Input and Output Summary (last 24 hours): Intake/Output Summary (Last 24 hours) at 10/16/2023 1116  Last data filed at 10/16/2023 0701  Gross per 24 hour   Intake 2220.42 ml   Output --   Net 2220.42 ml       Physical Exam:   Physical Exam  Vitals reviewed. Constitutional:       General: He is not in acute distress. Appearance: He is obese. He is not ill-appearing, toxic-appearing or diaphoretic. HENT:      Head: Normocephalic and atraumatic. Cardiovascular:      Rate and Rhythm: Regular rhythm. Tachycardia present. Heart sounds: Normal heart sounds. No murmur heard. No friction rub. No gallop. Pulmonary:      Effort: Pulmonary effort is normal. No tachypnea. Breath sounds: Normal breath sounds. No wheezing, rhonchi or rales. Abdominal:      General: Bowel sounds are normal.      Tenderness: There is no abdominal tenderness. Musculoskeletal:         General: No swelling. Right lower leg: No edema. Left lower leg: No edema. Skin:     General: Skin is warm and dry. Coloration: Skin is not cyanotic or jaundiced. Findings: No erythema. Neurological:      Mental Status: He is alert. Mental status is at baseline. Psychiatric:         Attention and Perception: Attention normal.         Mood and Affect: Mood normal.         Speech: Speech normal.         Behavior: Behavior is cooperative.           Additional Data:     Labs:  Results from last 7 days   Lab Units 10/16/23  0518   WBC Thousand/uL 8.81   HEMOGLOBIN g/dL 12.0   HEMATOCRIT % 37.5   PLATELETS Thousands/uL 220   NEUTROS PCT % 68   LYMPHS PCT % 17   MONOS PCT % 11   EOS PCT % 2     Results from last 7 days   Lab Units 10/16/23  0518 10/15/23  0532   SODIUM mmol/L 138 137   POTASSIUM mmol/L 3.5 3.3*   CHLORIDE mmol/L 102 98   CO2 mmol/L 29 31   BUN mg/dL 5 6   CREATININE mg/dL 0.64 0.61   ANION GAP mmol/L 7 8   CALCIUM mg/dL 7.9* 8.5   ALBUMIN g/dL  --  3.3* TOTAL BILIRUBIN mg/dL  --  0.98   ALK PHOS U/L  --  43   ALT U/L  --  31   AST U/L  --  20   GLUCOSE RANDOM mg/dL 96 88     Results from last 7 days   Lab Units 10/10/23  1642   INR  0.92             Results from last 7 days   Lab Units 10/14/23  0618 10/12/23  1014 10/10/23  1842 10/10/23  1642   LACTIC ACID mmol/L  --  0.7 1.3 2.3*   PROCALCITONIN ng/ml 0.44* 0.47*  --   --        Lines/Drains:  Invasive Devices       Peripheral Intravenous Line  Duration             Peripheral IV 10/14/23 Right Antecubital 2 days    Peripheral IV 10/16/23 Dorsal (posterior); Right Hand <1 day                          Imaging: Reviewed radiology reports from this admission including: chest xray, chest CT scan, and abdominal/pelvic CT    Recent Cultures (last 7 days):   Results from last 7 days   Lab Units 10/12/23  1013   BLOOD CULTURE  No Growth at 72 hrs. No Growth at 72 hrs. Last 24 Hours Medication List:   Current Facility-Administered Medications   Medication Dose Route Frequency Provider Last Rate    enoxaparin  40 mg Subcutaneous Daily Vinayak Walden MD      folic acid  1 mg Oral Daily Abigail Reynolds DO      morphine injection  4 mg Intravenous Q3H PRN Trenton Kate MD      nicotine  21 mg Transdermal Daily Vinayak Walden MD      ondansetron  4 mg Intravenous Q8H PRN Vinayak Walden MD      oxazepam  10 mg Oral [facility] Trenton Kate MD      pantoprazole  40 mg Oral Early Morning Trenton Kate MD      polyethylene glycol  17 g Oral Daily Abigail Reynolds DO      senna-docusate sodium  1 tablet Oral BID Abigail Reynolds DO      thiamine  100 mg Oral Daily Abigail Reynolds DO          Today, Patient Was Seen By: Nicole Bailey    **Please Note: This note may have been constructed using a voice recognition system. **

## 2023-10-16 NOTE — ASSESSMENT & PLAN NOTE
Secondary to alcohol use     Results from last 7 days   Lab Units 10/15/23  0532 10/14/23  0618 10/13/23  0533 10/12/23  0327 10/11/23  0447 10/10/23  1642   AST U/L 20 33 16 23 40* 65*   ALT U/L 31 35 37 59* 106* 134*   TOTAL BILIRUBIN mg/dL 0.98 1.73* 1.59* 1.33* 0.76 0.57

## 2023-10-16 NOTE — ASSESSMENT & PLAN NOTE
History of alcoholism and hypertension who was transferred from [facility] to [facility] for detox subsequently sent here for pancreatic necrosis  Patient being followed by surgery and has did well with bowel rest  Diet slowly advanced today to low-fat  Possible discharge next 24 hours if stable

## 2023-10-17 VITALS
TEMPERATURE: 97.6 F | HEIGHT: 72 IN | WEIGHT: 255.29 LBS | OXYGEN SATURATION: 94 % | HEART RATE: 94 BPM | SYSTOLIC BLOOD PRESSURE: 139 MMHG | RESPIRATION RATE: 18 BRPM | BODY MASS INDEX: 34.58 KG/M2 | DIASTOLIC BLOOD PRESSURE: 83 MMHG

## 2023-10-17 LAB
ALBUMIN SERPL BCP-MCNC: 3.7 G/DL (ref 3.5–5)
ALP SERPL-CCNC: 47 U/L (ref 34–104)
ALT SERPL W P-5'-P-CCNC: 51 U/L (ref 7–52)
ANION GAP SERPL CALCULATED.3IONS-SCNC: 7 MMOL/L
AST SERPL W P-5'-P-CCNC: 32 U/L (ref 13–39)
BACTERIA BLD CULT: NORMAL
BACTERIA BLD CULT: NORMAL
BILIRUB SERPL-MCNC: 0.63 MG/DL (ref 0.2–1)
BUN SERPL-MCNC: 6 MG/DL (ref 5–25)
CALCIUM SERPL-MCNC: 9.1 MG/DL (ref 8.4–10.2)
CHLORIDE SERPL-SCNC: 101 MMOL/L (ref 96–108)
CO2 SERPL-SCNC: 28 MMOL/L (ref 21–32)
CREAT SERPL-MCNC: 0.66 MG/DL (ref 0.6–1.3)
ERYTHROCYTE [DISTWIDTH] IN BLOOD BY AUTOMATED COUNT: 13.9 % (ref 11.6–15.1)
GFR SERPL CREATININE-BSD FRML MDRD: 121 ML/MIN/1.73SQ M
GLUCOSE SERPL-MCNC: 89 MG/DL (ref 65–140)
HCT VFR BLD AUTO: 43.3 % (ref 36.5–49.3)
HGB BLD-MCNC: 14 G/DL (ref 12–17)
MCH RBC QN AUTO: 30.9 PG (ref 26.8–34.3)
MCHC RBC AUTO-ENTMCNC: 32.3 G/DL (ref 31.4–37.4)
MCV RBC AUTO: 96 FL (ref 82–98)
PLATELET # BLD AUTO: 311 THOUSANDS/UL (ref 149–390)
PMV BLD AUTO: 10.4 FL (ref 8.9–12.7)
POTASSIUM SERPL-SCNC: 3.8 MMOL/L (ref 3.5–5.3)
PROT SERPL-MCNC: 7.7 G/DL (ref 6.4–8.4)
RBC # BLD AUTO: 4.53 MILLION/UL (ref 3.88–5.62)
SODIUM SERPL-SCNC: 136 MMOL/L (ref 135–147)
WBC # BLD AUTO: 10.11 THOUSAND/UL (ref 4.31–10.16)

## 2023-10-17 PROCEDURE — 80053 COMPREHEN METABOLIC PANEL: CPT | Performed by: INTERNAL MEDICINE

## 2023-10-17 PROCEDURE — 85027 COMPLETE CBC AUTOMATED: CPT | Performed by: INTERNAL MEDICINE

## 2023-10-17 PROCEDURE — 99239 HOSP IP/OBS DSCHRG MGMT >30: CPT | Performed by: INTERNAL MEDICINE

## 2023-10-17 RX ORDER — LANOLIN ALCOHOL/MO/W.PET/CERES
100 CREAM (GRAM) TOPICAL DAILY
Qty: 30 TABLET | Refills: 0 | Status: SHIPPED | OUTPATIENT
Start: 2023-10-18

## 2023-10-17 RX ORDER — FOLIC ACID 1 MG/1
1 TABLET ORAL DAILY
Qty: 30 TABLET | Refills: 0 | Status: SHIPPED | OUTPATIENT
Start: 2023-10-18

## 2023-10-17 RX ORDER — OXYCODONE HYDROCHLORIDE 5 MG/1
5 TABLET ORAL EVERY 8 HOURS PRN
Qty: 10 TABLET | Refills: 0 | Status: SHIPPED | OUTPATIENT
Start: 2023-10-17

## 2023-10-17 RX ORDER — LORAZEPAM 0.5 MG/1
0.5 TABLET ORAL EVERY 8 HOURS PRN
Qty: 10 TABLET | Refills: 0 | Status: SHIPPED | OUTPATIENT
Start: 2023-10-17

## 2023-10-17 RX ADMIN — OXYCODONE HYDROCHLORIDE 5 MG: 5 TABLET ORAL at 10:02

## 2023-10-17 RX ADMIN — SENNOSIDES AND DOCUSATE SODIUM 1 TABLET: 8.6; 5 TABLET ORAL at 09:58

## 2023-10-17 RX ADMIN — OXAZEPAM 10 MG: 10 CAPSULE, GELATIN COATED ORAL at 13:33

## 2023-10-17 RX ADMIN — Medication 21 MG: at 09:58

## 2023-10-17 RX ADMIN — OXAZEPAM 10 MG: 10 CAPSULE, GELATIN COATED ORAL at 05:16

## 2023-10-17 RX ADMIN — ENOXAPARIN SODIUM 40 MG: 40 INJECTION SUBCUTANEOUS at 09:58

## 2023-10-17 RX ADMIN — POLYETHYLENE GLYCOL 3350 17 G: 17 POWDER, FOR SOLUTION ORAL at 09:58

## 2023-10-17 RX ADMIN — PANTOPRAZOLE SODIUM 40 MG: 40 TABLET, DELAYED RELEASE ORAL at 05:16

## 2023-10-17 RX ADMIN — THIAMINE HCL TAB 100 MG 100 MG: 100 TAB at 09:58

## 2023-10-17 RX ADMIN — FOLIC ACID 1 MG: 1 TABLET ORAL at 09:58

## 2023-10-17 RX ADMIN — OXYCODONE HYDROCHLORIDE 5 MG: 5 TABLET ORAL at 05:16

## 2023-10-17 NOTE — CERTIFIED RECOVERY SPECIALIST
Certified  Note    Patient name: Carrie Ricketts  Location: East 4 /E4  [facility]  Attending:  Jsoh Zambrano DO MRN [id number]  : 1985  Age: 43 y.o.     Sex: male Date 10/17/2023         Substance Use History:     Social History     Substance and Sexual Activity   Alcohol Use Yes    Comment: pt. voiced he has been consuming "a lot" of alcohol lately        Social History     Substance and Sexual Activity   Drug Use Never     CRS will meet with patient today, recovery resources added to [facility]

## 2023-10-17 NOTE — DISCHARGE SUMMARY
[address] [facility]  Discharge- Carrie Ricketts 1985, 43 y.o. male MRN: [id number]  Unit/Bed#: E4 -01 Encounter: [id number]  Primary Care Provider: No primary care provider on file. Date and time admitted to hospital: 10/12/2023  1:36 PM    Admitting Provider:  Yohana Vanegas MD  Discharge Provider:  Josh Zambrano DO  Admission Date: 10/12/2023       Discharge Date: 10/17/23   LOS: 5  Primary Care Physician at Discharge: No primary care provider on file. None    DISCHARGE DIAGNOSES  * Alcohol-induced acute pancreatitis with infected necrosis  Assessment & Plan  History of alcoholism and hypertension who was transferred from [facility] to [facility] for detox subsequently sent here for pancreatic necrosis  Patient being followed by surgery and has did well with bowel rest  Diet slowly advanced to low-fat  No evidence of infection or further decompensation to warrant antibiotics  Discharge: Oxycodone 5 mg 10 tablets sent to pharmacy to use on as-needed basis    Results from last 7 days   Lab Units 10/12/23  0327 10/11/23  0447   LIPASE u/L 990* 1,780*       Elevated LFTs  Assessment & Plan  Secondary to alcohol use. Resolved    Results from last 7 days   Lab Units 10/17/23  0526 10/15/23  0532 10/14/23  0618 10/13/23  0533 10/12/23  0327 10/11/23  0447   AST U/L 32 20 33 16 23 40*   ALT U/L 51 31 35 37 59* 106*   TOTAL BILIRUBIN mg/dL 0.63 0.98 1.73* 1.59* 1.33* 0.76         Alcohol use disorder, severe, dependence (HCC)  Assessment & Plan  [facility] protocol stable. Continue thiamine and folic acid.     Was on oxazepam with tapering doses  Patient not interested in inpatient alcohol rehab  Currently stable will discharge with lorazepam 0.5 mg 10 tablets to take as needed for withdrawal symptoms    HTN, goal below 140/90  Assessment & Plan  Stable can resume lisinopril after discharge      REASON FOR ADMISSION  Pancreatitis    HOSPITAL COURSE:  Carrie Ricketts is a 43 y.o. male with a history of hypertension anxiety and alcohol use who presented initially to [address] [place] [address] for abdominal pain and alcohol withdrawal symptoms. Imaging did not demonstrate any necrosis but was noted to have pancreatitis. He elected to be transferred to detox unit at [address] [place] [address] on 10/10/2023. On 10/12 she had repeat imaging demonstrating concerns for pancreatic necrosis and was transferred here to critical care service for closer monitoring. He was seen by surgery and remained stable during hospitalization. His diet was slowly advanced and he is being discharged home in good condition. He did not have any further withdrawal symptoms. He preferred outpatient host services and will be discharged today. Please see problem list listed above. CONSULTING PROVIDERS   IP CONSULT TO CASE MANAGEMENT  IP CONSULT TO ACUTE CARE SURGERY    PROCEDURES PERFORMED  Echo complete w/ contrast if indicated    Result Date: 10/12/2023  Narrative:   Left Ventricle: Left ventricular cavity size is normal. Wall thickness is normal. The left ventricular ejection fraction is 55%. Systolic function is normal. Wall motion is normal. Diastolic function is normal.       RADIOLOGY RESULTS  CTA chest (pe study) abdomen pelvis contrast    Result Date: 10/12/2023  Impression: No pulmonary embolus. Mild bibasilar atelectasis and minimal pleural effusions. Mild pulmonary emphysema noted. Acute pancreatitis with interval development of necrosis (less than 30%) involving the tail. Interval worsening of extensive retroperitoneal fat stranding and edema. No discrete collections. The study was marked in [facility] for immediate notification. Workstation performed: [id number]     XR chest portable    Result Date: 10/12/2023  Impression: No acute cardiopulmonary disease.  Workstation performed: [id number]       LABS  Results from last 7 days   Lab Units 10/17/23  0526 10/16/23  0518 10/15/23  0532 10/14/23  0609 10/13/23  0533 10/12/23  0327 10/11/23  0447   WBC Thousand/uL 10.11 8.81 7.79 9.52 11.85* 13.33* 12.53*   HEMOGLOBIN g/dL 14.0 12.0 12.2 12.6 13.5 15.4 17.0   HEMATOCRIT % 43.3 37.5 37.8 36.6 41.5 45.2 48.4   MCV fL 96 96 95 92 95 91 90   PLATELETS Thousands/uL 311 220 192 167 143* 145* 164     Results from last 7 days   Lab Units 10/17/23  0526 10/16/23  0518 10/15/23  0532 10/14/23  0705 10/14/23  0618 10/13/23  0533 10/12/23  0327 10/11/23  0447   SODIUM mmol/L 136 138 137 133*  --  135 134* 135   POTASSIUM mmol/L 3.8 3.5 3.3* 4.2  --  3.4* 3.6 4.2   CHLORIDE mmol/L 101 102 98 98  --  100 101 102   CO2 mmol/L 28 29 31 29  --  27 27 26   BUN mg/dL 6 5 6 5  --  7 8 8   CREATININE mg/dL 0.66 0.64 0.61 0.58*  --  0.59* 0.72 0.63   CALCIUM mg/dL 9.1 7.9* 8.5 8.1*  --  7.9* 8.1* 8.5   ALBUMIN g/dL 3.7  --  3.3*  --  3.3* 3.3* 3.3* 4.0   TOTAL BILIRUBIN mg/dL 0.63  --  0.98  --  1.73* 1.59* 1.33* 0.76   ALK PHOS U/L 47  --  43  --  43 37 35 43   ALT U/L 51  --  31  --  35 37 59* 106*   AST U/L 32  --  20  --  33 16 23 40*   EGFR ml/min/1.73sq m 121 123 125 128  --  127 117 124   GLUCOSE RANDOM mg/dL 89 96 88 103  --  96 116 127       Results from last 7 days   Lab Units 10/14/23  0618 10/12/23  1014   LACTIC ACID mmol/L  --  0.7   PROCALCITONIN ng/ml 0.44* 0.47*           Cultures:   Results from last 7 days   Lab Units 10/12/23  1040   COLOR UA  Nicolle*   CLARITY UA  Clear   SPEC GRAV UA  1.010   PH UA  6.0   LEUKOCYTES UA  25.0*   NITRITE UA  Negative   GLUCOSE UA mg/dl Negative   KETONES UA mg/dl Negative   BILIRUBIN UA  Negative   BLOOD UA  Negative      Results from last 7 days   Lab Units 10/12/23  1040   RBC UA /hpf 0-1   WBC UA /hpf 2-4   EPITHELIAL CELLS WET PREP /hpf Occasional   BACTERIA UA /hpf Occasional      Results from last 7 days   Lab Units 10/12/23  1013 10/12/23  0953   BLOOD CULTURE  No Growth After 5 Days. No Growth After 5 Days.   --    INFLUENZA A PCR   --  Negative     Results from last 7 days   Lab Units 10/12/23  0970 SARS-COV-2  Negative   INFLUENZA A PCR  Negative   INFLUENZA B PCR  Negative   RSV PCR  Negative         DISCHARGE DAY VISIT AND PHYSICAL EXAM:  Subjective: No complaints, tolerating solid diet. Feeling better. Vitals:   Blood Pressure: 139/83 (10/17/23 0750)  Pulse: 94 (10/17/23 0750)  Temperature: 97.6 °F (36.4 °C) (10/17/23 0750)  Temp Source: Temporal (10/17/23 0750)  Respirations: 18 (10/17/23 0750)  Height: 6' (182.9 cm) (10/12/23 1408)  Weight - Scale: 116 kg (255 lb 4.7 oz) (10/17/23 0600)  SpO2: 94 % (10/17/23 0750)    Physical Exam  Vitals reviewed. Constitutional:       General: He is not in acute distress. Appearance: He is obese. HENT:      Head: Atraumatic. Eyes:      General: No scleral icterus. Cardiovascular:      Rate and Rhythm: Regular rhythm. Heart sounds: Normal heart sounds. Pulmonary:      Effort: Pulmonary effort is normal.      Breath sounds: No wheezing. Abdominal:      General: Bowel sounds are normal.      Palpations: Abdomen is soft. Tenderness: There is no abdominal tenderness. Musculoskeletal:         General: No swelling or tenderness. Skin:     General: Skin is warm. Neurological:      General: No focal deficit present. Mental Status: He is alert. Cranial Nerves: No cranial nerve deficit. Psychiatric:         Mood and Affect: Mood normal.       Planned Re-admission: No  Discharge Disposition: Home/Self Care  Facility:     Test Results Pending at Discharge:   Incidental findings:     Medications   Discharge Medication List: See after visit summary for reconciled discharge medications. Diet restrictions: Low-fat diet   Activity restrictions: No strenuous activity  Discharge Condition: stable    Outpatient Follow-Up and Discharge Instructions  See after visit summary section titled Discharge Instructions for information provided to patient and family.       Code Status:   Discharge Statement   I spent 35 minutes discharging the patient. This time was spent on the day of discharge. Greater than 50% of total time was spent with the patient and / or family counseling and / or coordination of care. ** Please Note: This note has been constructed using a voice recognition system.  **

## 2023-10-17 NOTE — NURSING NOTE
Patient discharged, stable at this time. AVS reviewed. IVs removed. No further questions at this time. No papers left in chart, no belongings left in room.

## 2023-10-17 NOTE — PLAN OF CARE
Problem: MOBILITY - ADULT  Goal: Maintain or return to baseline ADL function  Description: INTERVENTIONS:  -  Assess patient's ability to carry out ADLs; assess patient's baseline for ADL function and identify physical deficits which impact ability to perform ADLs (bathing, care of mouth/teeth, toileting, grooming, dressing, etc.)  - Assess/evaluate cause of self-care deficits   - Assess range of motion  - Assess patient's mobility; develop plan if impaired  - Assess patient's need for assistive devices and provide as appropriate  - Encourage maximum independence but intervene and supervise when necessary  - Involve family in performance of ADLs  - Assess for home care needs following discharge   - Consider OT consult to assist with ADL evaluation and planning for discharge  - Provide patient education as appropriate  Outcome: Completed  Goal: Maintains/Returns to pre admission functional level  Description: INTERVENTIONS:  - Perform BMAT or MOVE assessment daily.   - Set and communicate daily mobility goal to care team and patient/family/caregiver.    - Collaborate with rehabilitation services on mobility goals if consulted  - Out of bed for toileting  - Record patient progress and toleration of activity level   Outcome: Completed     Problem: PAIN - ADULT  Goal: Verbalizes/displays adequate comfort level or baseline comfort level  Description: Interventions:  - Encourage patient to monitor pain and request assistance  - Assess pain using appropriate pain scale  - Administer analgesics based on type and severity of pain and evaluate response  - Implement non-pharmacological measures as appropriate and evaluate response  - Consider cultural and social influences on pain and pain management  - Notify physician/advanced practitioner if interventions unsuccessful or patient reports new pain  Outcome: Completed     Problem: INFECTION - ADULT  Goal: Absence or prevention of progression during hospitalization  Description: INTERVENTIONS:  - Assess and monitor for signs and symptoms of infection  - Monitor lab/diagnostic results  - Monitor all insertion sites, i.e. indwelling lines, tubes, and drains  - Monitor endotracheal if appropriate and nasal secretions for changes in amount and color  - [place] appropriate cooling/warming therapies per order  - Administer medications as ordered  - Instruct and encourage patient and family to use good hand hygiene technique  - Identify and instruct in appropriate isolation precautions for identified infection/condition  Outcome: Completed  Goal: Absence of fever/infection during neutropenic period  Description: INTERVENTIONS:  - Monitor WBC    Outcome: Completed     Problem: SAFETY ADULT  Goal: Maintain or return to baseline ADL function  Description: INTERVENTIONS:  -  Assess patient's ability to carry out ADLs; assess patient's baseline for ADL function and identify physical deficits which impact ability to perform ADLs (bathing, care of mouth/teeth, toileting, grooming, dressing, etc.)  - Assess/evaluate cause of self-care deficits   - Assess range of motion  - Assess patient's mobility; develop plan if impaired  - Assess patient's need for assistive devices and provide as appropriate  - Encourage maximum independence but intervene and supervise when necessary  - Involve family in performance of ADLs  - Assess for home care needs following discharge   - Consider OT consult to assist with ADL evaluation and planning for discharge  - Provide patient education as appropriate  Outcome: Completed  Goal: Maintains/Returns to pre admission functional level  Description: INTERVENTIONS:  - Perform BMAT or MOVE assessment daily.   - Set and communicate daily mobility goal to care team and patient/family/caregiver.    - Collaborate with rehabilitation services on mobility goals if consulted  - Out of bed for toileting  - Record patient progress and toleration of activity level   Outcome: Completed  Goal: Patient will remain free of falls  Description: INTERVENTIONS:  - Educate patient/family on patient safety including physical limitations  - Instruct patient to call for assistance with activity   - Consult OT/PT to assist with strengthening/mobility   - Keep Call bell within reach  - Keep bed low and locked with side rails adjusted as appropriate  - Keep care items and personal belongings within reach  - Initiate and maintain comfort rounds  - Make Fall Risk Sign visible to staff  - Offer Toileting every  Hours, in advance of need  - Initiate/Maintain alarm  - Obtain necessary fall risk management equipment:   - Apply yellow socks and bracelet for high fall risk patients  - Consider moving patient to room near nurses station  Outcome: Completed     Problem: DISCHARGE PLANNING  Goal: Discharge to home or other facility with appropriate resources  Description: INTERVENTIONS:  - Identify barriers to discharge w/patient and caregiver  - Arrange for needed discharge resources and transportation as appropriate  - Identify discharge learning needs (meds, wound care, etc.)  - Arrange for interpretive services to assist at discharge as needed  - Refer to Case Management Department for coordinating discharge planning if the patient needs post-hospital services based on physician/advanced practitioner order or complex needs related to functional status, cognitive ability, or social support system  Outcome: Completed     Problem: Knowledge Deficit  Goal: Patient/family/caregiver demonstrates understanding of disease process, treatment plan, medications, and discharge instructions  Description: Complete learning assessment and assess knowledge base.   Interventions:  - Provide teaching at level of understanding  - Provide teaching via preferred learning methods  Outcome: Completed     Problem: NEUROSENSORY - ADULT  Goal: Remains free of injury related to seizures activity  Description: INTERVENTIONS  - Maintain airway, patient safety  and administer oxygen as ordered  - Monitor patient for seizure activity, document and report duration and description of seizure to physician/advanced practitioner  - If seizure occurs,  ensure patient safety during seizure  - Reorient patient post seizure  - Seizure pads on all 4 side rails  - Instruct patient/family to notify RN of any seizure activity including if an aura is experienced  - Instruct patient/family to call for assistance with activity based on nursing assessment  - Administer anti-seizure medications if ordered    Outcome: Completed     Problem: Prexisting or High Potential for Compromised Skin Integrity  Goal: Skin integrity is maintained or improved  Description: INTERVENTIONS:  - Identify patients at risk for skin breakdown  - Assess and monitor skin integrity  - Assess and monitor nutrition and hydration status  - Monitor labs   - Assess for incontinence   - Turn and reposition patient  - Assist with mobility/ambulation  - Relieve pressure over bony prominences  - Avoid friction and shearing  - Provide appropriate hygiene as needed including keeping skin clean and dry  - Evaluate need for skin moisturizer/barrier cream  - Collaborate with interdisciplinary team   - Patient/family teaching  - Consider wound care consult   Outcome: Completed

## 2023-10-17 NOTE — ASSESSMENT & PLAN NOTE
History of alcoholism and hypertension who was transferred from [facility] to [facility] for detox subsequently sent here for pancreatic necrosis  Patient being followed by surgery and has did well with bowel rest  Diet slowly advanced to low-fat  No evidence of infection or further decompensation to warrant antibiotics  Discharge: Oxycodone 5 mg 10 tablets sent to pharmacy to use on as-needed basis    Results from last 7 days   Lab Units 10/12/23  0327 10/11/23  0447   LIPASE u/L 990* 1,780*

## 2023-10-17 NOTE — ASSESSMENT & PLAN NOTE
Secondary to alcohol use.   Resolved    Results from last 7 days   Lab Units 10/17/23  0526 10/15/23  0532 10/14/23  0618 10/13/23  0533 10/12/23  0327 10/11/23  0447   AST U/L 32 20 33 16 23 40*   ALT U/L 51 31 35 37 59* 106*   TOTAL BILIRUBIN mg/dL 0.63 0.98 1.73* 1.59* 1.33* 0.76

## 2023-10-17 NOTE — ASSESSMENT & PLAN NOTE
[facility] protocol stable. Continue thiamine and folic acid.     Was on oxazepam with tapering doses  Patient not interested in inpatient alcohol rehab  Currently stable will discharge with lorazepam 0.5 mg 10 tablets to take as needed for withdrawal symptoms

## 2023-10-18 NOTE — UTILIZATION REVIEW
NOTIFICATION OF ADMISSION DISCHARGE   This is a Notification of Discharge from [address] [facility]. Please be advised that this patient has been discharge from our facility. Below you will find the admission and discharge date and time including the patient’s disposition. UTILIZATION REVIEW CONTACT:  Neida King  Utilization   Network Utilization Review Department  Phone: [phone] x [phone] listen to the prompts. All voicemails are confidential.  Email: [email or web address]     ADMISSION INFORMATION  PRESENTATION DATE: 10/12/2023  1:36 PM  OBERVATION ADMISSION DATE:   INPATIENT ADMISSION DATE: 10/12/23  1:36 PM   DISCHARGE DATE: 10/17/2023  2:26 PM   DISPOSITION:Home/Self Care    Network Utilization Review Department  ATTENTION: Please call with any questions or concerns to [phone] and carefully listen to the prompts so that you are directed to the right person. All voicemails are confidential.   For Discharge needs, contact Care Management DC Support Team at [phone] opt. 2  Send all requests for admission clinical reviews, approved or denied determinations and any other requests to dedicated fax number below belonging to the campus where the patient is receiving treatment.  List of dedicated fax numbers for the Facilities:  [place] DENIALS (Administrative/Medical Necessity) [phone]   DISCHARGE SUPPORT TEAM (Network) [phone] [facility] [phone]   [address] [facility] 1000 [facility] 207 [facility] [address] [phone] [address] [facility] [phone]   [address] [place] [address]  [address]  [phone]

## (undated) DEVICE — SCD SEQUENTIAL COMPRESSION COMFORT SLEEVE MEDIUM KNEE LENGTH: Brand: KENDALL SCD

## (undated) DEVICE — SUT VICRYL 3-0 SH 27 IN J416H

## (undated) DEVICE — TOWEL SET X-RAY

## (undated) DEVICE — GLOVE SRG BIOGEL 6

## (undated) DEVICE — TOWEL SURG XR DETECT GREEN STRL RFD

## (undated) DEVICE — THREE-QUARTER SHEET: Brand: CONVERTORS

## (undated) DEVICE — ANTIBACTERIAL UNDYED BRAIDED (POLYGLACTIN 910), SYNTHETIC ABSORBABLE SUTURE: Brand: COATED VICRYL

## (undated) DEVICE — TIP COVER ACCESSORY

## (undated) DEVICE — SUT VICRYL 0 REEL 54 IN J287G

## (undated) DEVICE — ASTOUND STANDARD SURGICAL GOWN, XXL: Brand: CONVERTORS

## (undated) DEVICE — ROBOT DRAPE INST ARM DA VINCI SI

## (undated) DEVICE — DRAPE EQUIPMENT RF WAND

## (undated) DEVICE — ROBOT CAMERA DRAPE ARM

## (undated) DEVICE — ABSORBABLE WOUND CLOSURE DEVICE: Brand: V-LOC 180

## (undated) DEVICE — TROCAR: Brand: KII FIOS FIRST ENTRY

## (undated) DEVICE — FENESTRATED BIPOLAR FORCEPS: Brand: ENDOWRIST;DAVINCI SI

## (undated) DEVICE — INTENDED FOR TISSUE SEPARATION, AND OTHER PROCEDURES THAT REQUIRE A SHARP SURGICAL BLADE TO PUNCTURE OR CUT.: Brand: BARD-PARKER SAFETY BLADES SIZE 11, STERILE

## (undated) DEVICE — VIAL DECANTER

## (undated) DEVICE — GLOVE SRG BIOGEL ECLIPSE 6

## (undated) DEVICE — SUT VICRYL 2-0 SH 27 IN UNDYED J417H

## (undated) DEVICE — PENCIL ELECTROSURG E-Z CLEAN -0035H

## (undated) DEVICE — BETHLEHEM UNIVERSAL MINOR GEN: Brand: CARDINAL HEALTH

## (undated) DEVICE — TUBING SUCTION 5MM X 12 FT

## (undated) DEVICE — MEGASUTURECUT ND: Brand: ENDOWRIST;DAVINCI SI

## (undated) DEVICE — CANNULA SEAL

## (undated) DEVICE — SUT PDS II 2-0 SH 27 IN Z317H

## (undated) DEVICE — MONOPOLAR CURVED SCISSORS: Brand: ENDOWRIST;DAVINCI SI

## (undated) DEVICE — GAUZE SPONGES,16 PLY: Brand: CURITY

## (undated) DEVICE — ADHESIVE SKIN HIGH VISCOSITY EXOFIN 1ML

## (undated) DEVICE — TUBING INSUFFLATION SET ISO CONNECTOR

## (undated) DEVICE — VISUALIZATION SYSTEM: Brand: CLEARIFY

## (undated) DEVICE — TROCARS: Brand: KII® BALLOON BLUNT TIP SYSTEM

## (undated) DEVICE — LUBRICANT INST ELECTROLUBE ANTISTK WO PAD

## (undated) DEVICE — PMI DISPOSABLE PUNCTURE CLOSURE DEVICE / SUTURE GRASPER: Brand: PMI

## (undated) DEVICE — CHLORAPREP HI-LITE 26ML ORANGE

## (undated) DEVICE — NEEDLE 25G X 1 1/2

## (undated) DEVICE — ROBOT INST CANNULA 8MM OBTURATOR BLUNT DISP

## (undated) DEVICE — SUT VICRYL 4-0 PS-2 27 IN J426H

## (undated) DEVICE — ENDOPATH PNEUMONEEDLE INSUFFLATION NEEDLES WITH LUER LOCK CONNECTORS 120MM: Brand: ENDOPATH

## (undated) DEVICE — GLOVE INDICATOR PI UNDERGLOVE SZ 6.5 BLUE

## (undated) DEVICE — ROBOT CAMERA DRAPE HEAD

## (undated) DEVICE — 3M™ TEGADERM™ TRANSPARENT FILM DRESSING FRAME STYLE, 1626W, 4 IN X 4-3/4 IN (10 CM X 12 CM), 50/CT 4CT/CASE: Brand: 3M™ TEGADERM™

## (undated) DEVICE — PAD GROUNDING ADULT

## (undated) DEVICE — MEDI-VAC YANK SUCT HNDL W/TPRD BULBOUS TIP: Brand: CARDINAL HEALTH

## (undated) DEVICE — INTENDED FOR TISSUE SEPARATION, AND OTHER PROCEDURES THAT REQUIRE A SHARP SURGICAL BLADE TO PUNCTURE OR CUT.: Brand: BARD-PARKER SAFETY BLADES SIZE 15, STERILE

## (undated) DEVICE — ALLENTOWN LAP CHOLE APP PACK: Brand: CARDINAL HEALTH

## (undated) DEVICE — TUBING SMOKE EVAC W/FILTRATION DEVICE PLUMEPORT ACTIV